# Patient Record
Sex: FEMALE | Race: WHITE | NOT HISPANIC OR LATINO | Employment: OTHER | URBAN - METROPOLITAN AREA
[De-identification: names, ages, dates, MRNs, and addresses within clinical notes are randomized per-mention and may not be internally consistent; named-entity substitution may affect disease eponyms.]

---

## 2017-01-20 ENCOUNTER — TELEPHONE (OUTPATIENT)
Dept: SURGERY | Facility: CLINIC | Age: 67
End: 2017-01-20

## 2017-01-20 NOTE — TELEPHONE ENCOUNTER
Called pt to get her imaging rescheduled.    She is scheduled for [facility] Diagnostic Mammogram @ [facility] 4-25-17 @ 10:00  (pt picked date)    Return to see Dr FERRARO 5-12-17 arrive 9:45    kdl

## 2017-04-24 ENCOUNTER — TELEPHONE (OUTPATIENT)
Dept: SURGERY | Facility: CLINIC | Age: 67
End: 2017-04-24

## 2017-04-24 DIAGNOSIS — Z12.39 BREAST SCREENING: Primary | ICD-10-CM

## 2017-04-24 NOTE — TELEPHONE ENCOUNTER
[facility] called, pt never had her imaging in Nov 2016   She is now due her routine mammogram. [facility] needs an order   For this. Pt has appt with them on 4-26-17     Thank you   k     I will arrange for her to have a left mammogram in November 2016 as a 6 month follow-up.  Her next routine mammogram is due April 26, 2017.  [facility].           Spoke with Beverly at [facility]  I faxed over order for Avel Screen Mammo 3D  Since pt is already scheduled for tomorrow they will add this on.  kdl 4-24-17

## 2017-04-26 ENCOUNTER — TELEPHONE (OUTPATIENT)
Dept: SURGERY | Facility: CLINIC | Age: 67
End: 2017-04-26

## 2017-04-26 NOTE — TELEPHONE ENCOUNTER
[facility] April 25, 2017 bilateral mammogram with shawna synthesis.  The breast is heterogenous leg dense.  No mammographic evidence for malignancy in either breast.  BI-RADS 2.

## 2017-05-01 ENCOUNTER — OFFICE VISIT (OUTPATIENT)
Dept: SURGERY | Facility: CLINIC | Age: 67
End: 2017-05-01

## 2017-05-01 VITALS
TEMPERATURE: 98.1 F | HEIGHT: 62 IN | BODY MASS INDEX: 30 KG/M2 | SYSTOLIC BLOOD PRESSURE: 142 MMHG | OXYGEN SATURATION: 96 % | HEART RATE: 85 BPM | WEIGHT: 163 LBS | DIASTOLIC BLOOD PRESSURE: 83 MMHG

## 2017-05-01 DIAGNOSIS — Z12.39 BREAST SCREENING: ICD-10-CM

## 2017-05-01 DIAGNOSIS — N60.19 DIFFUSE CYSTIC MASTOPATHY, UNSPECIFIED LATERALITY: Primary | ICD-10-CM

## 2017-05-01 DIAGNOSIS — Z80.41 FH: OVARIAN CANCER: ICD-10-CM

## 2017-05-01 PROCEDURE — 99212 OFFICE O/P EST SF 10 MIN: CPT | Performed by: SURGERY

## 2017-05-01 RX ORDER — PRAVASTATIN SODIUM 20 MG
TABLET ORAL
Refills: 5 | COMMUNITY
Start: 2017-04-17

## 2017-08-07 ENCOUNTER — CONVERSION ENCOUNTER (OUTPATIENT)
Dept: ORTHOPEDIC SURGERY | Facility: CLINIC | Age: 67
End: 2017-08-07

## 2017-09-05 ENCOUNTER — HOSPITAL ENCOUNTER (OUTPATIENT)
Dept: OTHER | Facility: HOSPITAL | Age: 67
Discharge: HOME | End: 2017-09-05
Attending: ORTHOPAEDIC SURGERY | Admitting: ORTHOPAEDIC SURGERY

## 2018-11-15 ENCOUNTER — TRANSCRIBE ORDERS (OUTPATIENT)
Dept: ADMINISTRATIVE | Facility: HOSPITAL | Age: 68
End: 2018-11-15

## 2018-11-15 DIAGNOSIS — M25.561 ACUTE BILATERAL KNEE PAIN: Primary | ICD-10-CM

## 2018-11-15 DIAGNOSIS — M25.562 ACUTE BILATERAL KNEE PAIN: Primary | ICD-10-CM

## 2018-11-15 DIAGNOSIS — Z96.653 HISTORY OF BILATERAL KNEE REPLACEMENT: ICD-10-CM

## 2018-11-19 ENCOUNTER — HOSPITAL ENCOUNTER (OUTPATIENT)
Dept: NUCLEAR MEDICINE | Facility: HOSPITAL | Age: 68
Discharge: HOME | End: 2018-11-19
Attending: ORTHOPAEDIC SURGERY

## 2018-11-19 ENCOUNTER — TRANSCRIBE ORDERS (OUTPATIENT)
Dept: ADMINISTRATIVE | Facility: HOSPITAL | Age: 68
End: 2018-11-19

## 2018-11-19 ENCOUNTER — LAB (OUTPATIENT)
Dept: LAB | Facility: HOSPITAL | Age: 68
End: 2018-11-19
Attending: ORTHOPAEDIC SURGERY

## 2018-11-19 DIAGNOSIS — Z96.653 HISTORY OF BILATERAL KNEE REPLACEMENT: ICD-10-CM

## 2018-11-19 DIAGNOSIS — M25.562 ACUTE BILATERAL KNEE PAIN: ICD-10-CM

## 2018-11-19 DIAGNOSIS — M25.561 ACUTE BILATERAL KNEE PAIN: ICD-10-CM

## 2018-11-19 DIAGNOSIS — Z47.89 ORTHOPEDIC AFTERCARE: ICD-10-CM

## 2018-11-19 DIAGNOSIS — M25.561 RIGHT KNEE PAIN, UNSPECIFIED CHRONICITY: ICD-10-CM

## 2018-11-19 DIAGNOSIS — M25.561 RIGHT KNEE PAIN, UNSPECIFIED CHRONICITY: Primary | ICD-10-CM

## 2018-11-19 LAB
CRP SERPL-MCNC: 0.22 MG/DL (ref 0–0.5)
DEPRECATED RDW RBC AUTO: 56.9 FL (ref 37–54)
ERYTHROCYTE [DISTWIDTH] IN BLOOD BY AUTOMATED COUNT: 18.6 % (ref 11.7–13)
ERYTHROCYTE [SEDIMENTATION RATE] IN BLOOD: 13 MM/HR (ref 0–30)
HCT VFR BLD AUTO: 38.2 % (ref 35.6–45.5)
HGB BLD-MCNC: 11.4 G/DL (ref 11.9–15.5)
MCH RBC QN AUTO: 25 PG (ref 26.9–32)
MCHC RBC AUTO-ENTMCNC: 29.8 G/DL (ref 32.4–36.3)
MCV RBC AUTO: 83.8 FL (ref 80.5–98.2)
PLATELET # BLD AUTO: 350 10*3/MM3 (ref 140–500)
PMV BLD AUTO: 9 FL (ref 6–12)
RBC # BLD AUTO: 4.56 10*6/MM3 (ref 3.9–5.2)
WBC NRBC COR # BLD: 6.02 10*3/MM3 (ref 4.5–10.7)

## 2018-11-19 PROCEDURE — 85652 RBC SED RATE AUTOMATED: CPT

## 2018-11-19 PROCEDURE — 0 TECHNETIUM MEDRONATE KIT: Performed by: ORTHOPAEDIC SURGERY

## 2018-11-19 PROCEDURE — 85027 COMPLETE CBC AUTOMATED: CPT

## 2018-11-19 PROCEDURE — 78315 BONE IMAGING 3 PHASE: CPT

## 2018-11-19 PROCEDURE — A9503 TC99M MEDRONATE: HCPCS | Performed by: ORTHOPAEDIC SURGERY

## 2018-11-19 PROCEDURE — 36415 COLL VENOUS BLD VENIPUNCTURE: CPT

## 2018-11-19 PROCEDURE — 86140 C-REACTIVE PROTEIN: CPT

## 2018-11-19 RX ORDER — TC 99M MEDRONATE 20 MG/10ML
21.8 INJECTION, POWDER, LYOPHILIZED, FOR SOLUTION INTRAVENOUS
Status: COMPLETED | OUTPATIENT
Start: 2018-11-19 | End: 2018-11-19

## 2018-11-19 RX ADMIN — Medication 21.8 MILLICURIE: at 09:31

## 2019-06-04 VITALS
SYSTOLIC BLOOD PRESSURE: 111 MMHG | WEIGHT: 153 LBS | HEART RATE: 68 BPM | DIASTOLIC BLOOD PRESSURE: 69 MMHG | BODY MASS INDEX: 28.44 KG/M2

## 2020-10-29 ENCOUNTER — INPATIENT HOSPITAL (OUTPATIENT)
Dept: URBAN - METROPOLITAN AREA HOSPITAL 76 | Facility: HOSPITAL | Age: 70
End: 2020-10-29

## 2020-10-29 DIAGNOSIS — K44.9 DIAPHRAGMATIC HERNIA WITHOUT OBSTRUCTION OR GANGRENE: ICD-10-CM

## 2020-10-29 DIAGNOSIS — J45.909 UNSPECIFIED ASTHMA, UNCOMPLICATED: ICD-10-CM

## 2020-10-29 DIAGNOSIS — I10 ESSENTIAL (PRIMARY) HYPERTENSION: ICD-10-CM

## 2020-10-29 DIAGNOSIS — Z86.19 PERSONAL HISTORY OF OTHER INFECTIOUS AND PARASITIC DISEASES: ICD-10-CM

## 2020-10-29 DIAGNOSIS — R10.10 UPPER ABDOMINAL PAIN, UNSPECIFIED: ICD-10-CM

## 2020-10-29 DIAGNOSIS — R74.8 ABNORMAL LEVELS OF OTHER SERUM ENZYMES: ICD-10-CM

## 2020-10-29 PROCEDURE — 99222 1ST HOSP IP/OBS MODERATE 55: CPT | Performed by: NURSE PRACTITIONER

## 2020-10-30 ENCOUNTER — INPATIENT HOSPITAL (OUTPATIENT)
Dept: URBAN - METROPOLITAN AREA HOSPITAL 76 | Facility: HOSPITAL | Age: 70
End: 2020-10-30

## 2020-10-30 DIAGNOSIS — R10.13 EPIGASTRIC PAIN: ICD-10-CM

## 2020-10-30 DIAGNOSIS — K85.90 ACUTE PANCREATITIS WITHOUT NECROSIS OR INFECTION, UNSPECIFIE: ICD-10-CM

## 2020-10-30 DIAGNOSIS — K44.9 DIAPHRAGMATIC HERNIA WITHOUT OBSTRUCTION OR GANGRENE: ICD-10-CM

## 2020-10-30 PROCEDURE — 43235 EGD DIAGNOSTIC BRUSH WASH: CPT | Performed by: INTERNAL MEDICINE

## 2020-10-31 ENCOUNTER — INPATIENT HOSPITAL (OUTPATIENT)
Dept: URBAN - METROPOLITAN AREA HOSPITAL 76 | Facility: HOSPITAL | Age: 70
End: 2020-10-31

## 2020-10-31 DIAGNOSIS — K85.90 ACUTE PANCREATITIS WITHOUT NECROSIS OR INFECTION, UNSPECIFIE: ICD-10-CM

## 2020-10-31 PROCEDURE — 99232 SBSQ HOSP IP/OBS MODERATE 35: CPT | Performed by: INTERNAL MEDICINE

## 2020-11-01 ENCOUNTER — INPATIENT HOSPITAL (OUTPATIENT)
Dept: URBAN - METROPOLITAN AREA HOSPITAL 76 | Facility: HOSPITAL | Age: 70
End: 2020-11-01

## 2020-11-01 DIAGNOSIS — K85.90 ACUTE PANCREATITIS WITHOUT NECROSIS OR INFECTION, UNSPECIFIE: ICD-10-CM

## 2020-11-01 DIAGNOSIS — R10.13 EPIGASTRIC PAIN: ICD-10-CM

## 2020-11-01 PROCEDURE — 99232 SBSQ HOSP IP/OBS MODERATE 35: CPT | Performed by: INTERNAL MEDICINE

## 2020-11-04 ENCOUNTER — INPATIENT HOSPITAL (OUTPATIENT)
Dept: URBAN - METROPOLITAN AREA HOSPITAL 76 | Facility: HOSPITAL | Age: 70
End: 2020-11-04

## 2020-11-04 DIAGNOSIS — K44.9 DIAPHRAGMATIC HERNIA WITHOUT OBSTRUCTION OR GANGRENE: ICD-10-CM

## 2020-11-04 DIAGNOSIS — R10.13 EPIGASTRIC PAIN: ICD-10-CM

## 2020-11-04 DIAGNOSIS — R74.8 ABNORMAL LEVELS OF OTHER SERUM ENZYMES: ICD-10-CM

## 2020-11-04 PROCEDURE — 99221 1ST HOSP IP/OBS SF/LOW 40: CPT | Performed by: INTERNAL MEDICINE

## 2020-11-05 ENCOUNTER — INPATIENT HOSPITAL (OUTPATIENT)
Dept: URBAN - METROPOLITAN AREA HOSPITAL 76 | Facility: HOSPITAL | Age: 70
End: 2020-11-05

## 2020-11-05 DIAGNOSIS — R10.13 EPIGASTRIC PAIN: ICD-10-CM

## 2020-11-05 DIAGNOSIS — R93.2 ABNORMAL FINDINGS ON DIAGNOSTIC IMAGING OF LIVER AND BILIARY: ICD-10-CM

## 2020-11-05 DIAGNOSIS — R10.11 RIGHT UPPER QUADRANT PAIN: ICD-10-CM

## 2020-11-05 DIAGNOSIS — R74.8 ABNORMAL LEVELS OF OTHER SERUM ENZYMES: ICD-10-CM

## 2020-11-05 DIAGNOSIS — K86.1 OTHER CHRONIC PANCREATITIS: ICD-10-CM

## 2020-11-05 DIAGNOSIS — K44.9 DIAPHRAGMATIC HERNIA WITHOUT OBSTRUCTION OR GANGRENE: ICD-10-CM

## 2020-11-05 PROCEDURE — 99231 SBSQ HOSP IP/OBS SF/LOW 25: CPT | Performed by: NURSE PRACTITIONER

## 2020-11-11 ENCOUNTER — ON CAMPUS - OUTPATIENT (OUTPATIENT)
Dept: URBAN - METROPOLITAN AREA HOSPITAL 77 | Facility: HOSPITAL | Age: 70
End: 2020-11-11

## 2020-11-11 DIAGNOSIS — K44.9 DIAPHRAGMATIC HERNIA WITHOUT OBSTRUCTION OR GANGRENE: ICD-10-CM

## 2020-11-11 DIAGNOSIS — R10.13 EPIGASTRIC PAIN: ICD-10-CM

## 2020-11-11 DIAGNOSIS — R94.5 ABNORMAL RESULTS OF LIVER FUNCTION STUDIES: ICD-10-CM

## 2020-11-11 DIAGNOSIS — K83.8 OTHER SPECIFIED DISEASES OF BILIARY TRACT: ICD-10-CM

## 2020-11-11 PROCEDURE — 43262 ENDO CHOLANGIOPANCREATOGRAPH: CPT | Performed by: INTERNAL MEDICINE

## 2020-12-11 ENCOUNTER — ON CAMPUS - OUTPATIENT (OUTPATIENT)
Dept: URBAN - METROPOLITAN AREA HOSPITAL 77 | Facility: HOSPITAL | Age: 70
End: 2020-12-11

## 2020-12-11 DIAGNOSIS — K29.50 UNSPECIFIED CHRONIC GASTRITIS WITHOUT BLEEDING: ICD-10-CM

## 2020-12-11 DIAGNOSIS — K85.90 ACUTE PANCREATITIS WITHOUT NECROSIS OR INFECTION, UNSPECIFIE: ICD-10-CM

## 2020-12-11 DIAGNOSIS — K86.89 OTHER SPECIFIED DISEASES OF PANCREAS: ICD-10-CM

## 2020-12-11 DIAGNOSIS — K83.8 OTHER SPECIFIED DISEASES OF BILIARY TRACT: ICD-10-CM

## 2020-12-11 DIAGNOSIS — R94.5 ABNORMAL RESULTS OF LIVER FUNCTION STUDIES: ICD-10-CM

## 2020-12-11 DIAGNOSIS — K44.9 DIAPHRAGMATIC HERNIA WITHOUT OBSTRUCTION OR GANGRENE: ICD-10-CM

## 2020-12-11 PROCEDURE — 43238 EGD US FINE NEEDLE BX/ASPIR: CPT | Performed by: INTERNAL MEDICINE

## 2021-10-26 ENCOUNTER — OFFICE (OUTPATIENT)
Dept: URBAN - METROPOLITAN AREA CLINIC 64 | Facility: CLINIC | Age: 71
End: 2021-10-26

## 2021-10-26 VITALS
HEIGHT: 62 IN | DIASTOLIC BLOOD PRESSURE: 70 MMHG | WEIGHT: 153 LBS | HEART RATE: 78 BPM | SYSTOLIC BLOOD PRESSURE: 109 MMHG

## 2021-10-26 DIAGNOSIS — R19.7 DIARRHEA, UNSPECIFIED: ICD-10-CM

## 2021-10-26 DIAGNOSIS — R10.9 UNSPECIFIED ABDOMINAL PAIN: ICD-10-CM

## 2021-10-26 PROCEDURE — 99213 OFFICE O/P EST LOW 20 MIN: CPT | Performed by: INTERNAL MEDICINE

## 2021-10-26 RX ORDER — DICYCLOMINE HYDROCHLORIDE 10 MG/1
CAPSULE ORAL
Qty: 90 | Refills: 0 | Status: ACTIVE

## 2021-11-22 ENCOUNTER — OFFICE (OUTPATIENT)
Dept: URBAN - METROPOLITAN AREA CLINIC 64 | Facility: CLINIC | Age: 71
End: 2021-11-22

## 2021-11-22 VITALS
DIASTOLIC BLOOD PRESSURE: 80 MMHG | HEIGHT: 62 IN | HEART RATE: 71 BPM | SYSTOLIC BLOOD PRESSURE: 138 MMHG | WEIGHT: 155 LBS

## 2021-11-22 DIAGNOSIS — R19.7 DIARRHEA, UNSPECIFIED: ICD-10-CM

## 2021-11-22 DIAGNOSIS — K21.9 GASTRO-ESOPHAGEAL REFLUX DISEASE WITHOUT ESOPHAGITIS: ICD-10-CM

## 2021-11-22 PROCEDURE — 99213 OFFICE O/P EST LOW 20 MIN: CPT | Performed by: INTERNAL MEDICINE

## 2021-11-22 RX ORDER — DICYCLOMINE HYDROCHLORIDE 10 MG/1
CAPSULE ORAL
Qty: 90 | Refills: 0 | Status: ACTIVE

## 2021-11-22 RX ORDER — DICYCLOMINE HYDROCHLORIDE 20 MG/1
20 TABLET ORAL
Qty: 90 | Refills: 3 | Status: ACTIVE
Start: 2021-11-22

## 2025-01-16 ENCOUNTER — HOSPITAL ENCOUNTER (OUTPATIENT)
Dept: GENERAL RADIOLOGY | Facility: HOSPITAL | Age: 75
Discharge: HOME | End: 2025-01-16
Payer: MEDICARE

## 2025-01-16 ENCOUNTER — PRE-ADMISSION TESTING (OUTPATIENT)
Dept: PREADMISSION TESTING | Facility: HOSPITAL | Age: 75
End: 2025-01-16
Payer: MEDICARE

## 2025-01-16 VITALS
WEIGHT: 156 LBS | TEMPERATURE: 98.7 F | HEIGHT: 62 IN | OXYGEN SATURATION: 96 % | DIASTOLIC BLOOD PRESSURE: 83 MMHG | HEART RATE: 92 BPM | SYSTOLIC BLOOD PRESSURE: 161 MMHG | BODY MASS INDEX: 28.71 KG/M2 | RESPIRATION RATE: 18 BRPM

## 2025-01-16 LAB
ALBUMIN SERPL-MCNC: 3.8 G/DL (ref 3.5–5.2)
ALBUMIN/GLOB SERPL: 1.3 G/DL
ALP SERPL-CCNC: 73 U/L (ref 39–117)
ALT SERPL W P-5'-P-CCNC: 13 U/L (ref 1–33)
ANION GAP SERPL CALCULATED.3IONS-SCNC: 10.8 MMOL/L (ref 5–15)
AST SERPL-CCNC: 18 U/L (ref 1–32)
BILIRUB SERPL-MCNC: 0.3 MG/DL (ref 0–1.2)
BILIRUB UR QL STRIP: NEGATIVE
BUN SERPL-MCNC: 17 MG/DL (ref 8–23)
BUN/CREAT SERPL: 13.8 (ref 7–25)
CALCIUM SPEC-SCNC: 8.6 MG/DL (ref 8.6–10.5)
CHLORIDE SERPL-SCNC: 106 MMOL/L (ref 98–107)
CLARITY UR: CLEAR
CO2 SERPL-SCNC: 23.2 MMOL/L (ref 22–29)
COLOR UR: YELLOW
CREAT SERPL-MCNC: 1.23 MG/DL (ref 0.57–1)
DEPRECATED RDW RBC AUTO: 47 FL (ref 37–54)
EGFRCR SERPLBLD CKD-EPI 2021: 46.5 ML/MIN/1.73
ERYTHROCYTE [DISTWIDTH] IN BLOOD BY AUTOMATED COUNT: 14.1 % (ref 12.3–15.4)
GLOBULIN UR ELPH-MCNC: 3 GM/DL
GLUCOSE SERPL-MCNC: 112 MG/DL (ref 65–99)
GLUCOSE UR STRIP-MCNC: NEGATIVE MG/DL
HCT VFR BLD AUTO: 39.7 % (ref 34–46.6)
HGB BLD-MCNC: 12.7 G/DL (ref 12–15.9)
HGB UR QL STRIP.AUTO: NEGATIVE
KETONES UR QL STRIP: NEGATIVE
LEUKOCYTE ESTERASE UR QL STRIP.AUTO: NEGATIVE
MCH RBC QN AUTO: 29.2 PG (ref 26.6–33)
MCHC RBC AUTO-ENTMCNC: 32 G/DL (ref 31.5–35.7)
MCV RBC AUTO: 91.3 FL (ref 79–97)
NITRITE UR QL STRIP: NEGATIVE
PH UR STRIP.AUTO: 6.5 [PH] (ref 5–8)
PLATELET # BLD AUTO: 367 10*3/MM3 (ref 140–450)
PMV BLD AUTO: 9.5 FL (ref 6–12)
POTASSIUM SERPL-SCNC: 3.7 MMOL/L (ref 3.5–5.2)
PROT SERPL-MCNC: 6.8 G/DL (ref 6–8.5)
PROT UR QL STRIP: NEGATIVE
QT INTERVAL: 385 MS
QTC INTERVAL: 447 MS
RBC # BLD AUTO: 4.35 10*6/MM3 (ref 3.77–5.28)
SODIUM SERPL-SCNC: 140 MMOL/L (ref 136–145)
SP GR UR STRIP: 1.02 (ref 1–1.03)
UROBILINOGEN UR QL STRIP: NORMAL
WBC NRBC COR # BLD AUTO: 5.84 10*3/MM3 (ref 3.4–10.8)

## 2025-01-16 PROCEDURE — 36415 COLL VENOUS BLD VENIPUNCTURE: CPT

## 2025-01-16 PROCEDURE — 93010 ELECTROCARDIOGRAM REPORT: CPT | Performed by: INTERNAL MEDICINE

## 2025-01-16 PROCEDURE — 93005 ELECTROCARDIOGRAM TRACING: CPT

## 2025-01-16 PROCEDURE — 80053 COMPREHEN METABOLIC PANEL: CPT

## 2025-01-16 PROCEDURE — 71046 X-RAY EXAM CHEST 2 VIEWS: CPT

## 2025-01-16 PROCEDURE — 81003 URINALYSIS AUTO W/O SCOPE: CPT

## 2025-01-16 PROCEDURE — 85027 COMPLETE CBC AUTOMATED: CPT

## 2025-01-16 PROCEDURE — 73030 X-RAY EXAM OF SHOULDER: CPT

## 2025-01-16 RX ORDER — AMLODIPINE BESYLATE 10 MG/1
10 TABLET ORAL NIGHTLY
COMMUNITY

## 2025-01-16 RX ORDER — FLUOXETINE 40 MG/1
40 CAPSULE ORAL NIGHTLY
COMMUNITY

## 2025-01-16 RX ORDER — BUPROPION HYDROCHLORIDE 300 MG/1
300 TABLET ORAL NIGHTLY
COMMUNITY

## 2025-01-16 RX ORDER — DICYCLOMINE HYDROCHLORIDE 10 MG/1
10 CAPSULE ORAL
COMMUNITY

## 2025-01-16 RX ORDER — TRIAMTERENE CAPSULES 50 MG/1
50 CAPSULE ORAL NIGHTLY
COMMUNITY

## 2025-01-16 RX ORDER — MONTELUKAST SODIUM 10 MG/1
10 TABLET ORAL NIGHTLY
COMMUNITY

## 2025-01-16 NOTE — DISCHARGE INSTRUCTIONS
Take the following medications the morning of surgery: NONE      If you are on prescription narcotic pain medication to control your pain you may also take that medication the morning of surgery.      General Instructions:     Do not eat solid food after midnight the night before surgery.  Clear liquids day of surgery are allowed but must be stopped at least two hours before your hospital arrival time.       Allowed clear liquids      Water, sodas, and tea or coffee with no cream or milk added.       12 to 20 ounces of a clear liquid that contains carbohydrates is recommended.  If non-diabetic, have Gatorade or Powerade.  If diabetic, have G2 or Powerade Zero.     Do not have liquids red in color.  Do not consume chicken, beef, pork or vegetable broth or bouillon cubes of any variety as they are not considered clear liquids and are not allowed.      Infants may have breast milk up to four hours before surgery.  Infants drinking formula may drink formula up to six hours before surgery.   Patients who avoid smoking, chewing tobacco and alcohol for 4 weeks prior to surgery have a reduced risk of post-operative complications.  Quit smoking as many days before surgery as you can.  Do not smoke, use chewing tobacco or drink alcohol the day of surgery.   If applicable bring your C-PAP/ BI-PAP machine in with you to preop day of surgery.  Bring any papers given to you in the doctor’s office.  Wear clean comfortable clothes.  Do not wear contact lenses, false eyelashes or make-up.  Bring a case for your glasses.   Bring crutches or walker if applicable.  Remove all piercings.  Leave jewelry and any other valuables at home.  Hair extensions with metal clips must be removed prior to surgery.  The Pre-Admission Testing nurse will instruct you to bring medications if unable to obtain an accurate list in Pre-Admission Testing.          Day of surgery:  Your arrival time is approximately two hours before your scheduled surgery  time.  Upon arrival, a Pre-op nurse and Anesthesiologist will review your health history, obtain vital signs, and answer questions you may have.  The only belongings needed at this time will be a list of your home medications and if applicable your C-PAP/BI-PAP machine.  A Pre-op nurse will start an IV and you may receive medication in preparation for surgery, including something to help you relax.     Please be aware that surgery does come with discomfort.  We want to make every effort to control your discomfort so please discuss any uncontrolled symptoms with your nurse.   Your doctor will most likely have prescribed pain medications.      If you are going home after surgery you will receive individualized written care instructions before being discharged.  A responsible adult must drive you to and from the hospital on the day of your surgery and ideally stay with you through the night.  Discharge prescriptions can be filled by the hospital pharmacy during regular pharmacy hours.  If you are having surgery late in the day/evening your prescription may be e-prescribed to your pharmacy.  Please verify your pharmacy hours or chose a 24 hour pharmacy to avoid not having access to your prescription because your pharmacy has closed for the day.    If you are staying overnight following surgery, you will be transported to your hospital room following the recovery period.  [facility] has all private rooms.    If you have any questions please call Pre-Admission Testing at [phone].  Deductibles and co-payments are collected on the day of service. Please be prepared to pay the required co-pay, deductible or deposit on the day of service as defined by your plan.    Call your surgeon immediately if you experience any of the following symptoms:  Sore Throat  Shortness of Breath or difficulty breathing  Cough  Chills  Body soreness or muscle pain  Headache  Fever  New loss of taste or smell  Do not arrive  for your surgery ill.  Your procedure will need to be rescheduled to another time.  You will need to call your physician before the day of surgery to avoid any unnecessary exposure to hospital staff as well as other patients.        PREVENTING INFECTION IN SHOULDER SURGICAL SITES     C. acnes is a bacteria that lives deep within follicles and pores of the skin. It is found in large numbers on the skin of the face, axilla (armpit), chest and back and is the primary bacteria to cause a surgical site infection after shoulder surgery.      Use of a Benzoyl Peroxide solution prior to shoulder surgery decreases C. acnes and reduces post-op infections.   Your surgeon has ordered 5% Benzoyl Peroxide wash to be used three times prior to your surgery.     Please read the following instructions carefully and bring this form with you the day of surgery.     General bathing instructions starting two days before your surgery:    Shower using a fresh bar of anti-bacterial soap (such as Dial) and clean washcloth.  Pay special attention to the neck, shoulder and armpit area.   Wash your hair as usual with your regular shampoo.   Rinse hair and body thoroughly with warm water (not hot water) to remove shampoo and residue.   Dry with a clean towel.              Sleep in a clean bed with clean clothing.  Do not allow pets to sleep with you.     For 2 days before surgery, avoid shaving with a razor because the razor can irritate skin and make it easier to develop an infection.    Any areas of open skin can increase the risk of a post-operative wound infection by allowing bacteria to enter and travel throughout the body.  Notify your surgeon if you have any skin wounds / rashes even if it is not near the expected surgical site.  The area will need assessed to determine if surgery should be delayed until it is healed.      First application of 5% Benzoyl Peroxide Wash two nights before surgery:                                                                 Wash neck, shoulder (front, back, side) and armpit   with warm water, rinse and dry - see picture.  Gently wash the same areas with the Benzoyl Peroxide   cleanser going away from the neck for 10-20 seconds.   Work into a full lather and leave on the skin for   2 minutes for greatest effect.  Rinse thoroughly with warm water, not hot water.                     Pat dry with a clean towel.                                                            Wash your hands thoroughly.  Do not apply lotion, powder, perfume or deodorant.   Put on clean clothes.    Second application the night before surgery:  Repeat the above steps.        Third application morning of surgery:  Repeat the above steps.    Due to shoulder pain or decreased range of motion of your shoulder and arm, you may need assistance washing under the arm or the back portion of your shoulder.     Avoid further washing the areas of the skin treated with Benzoyl Peroxide for at least 1 hour.    For your convenience, you may purchase Benzoyl Peroxide at [facility] retail pharmacy.     Warning:  Let your physician know if you are allergic to Benzoyl Peroxide or have very sensitive skin and cannot use it.   Stop using and contact your surgeon if you experience any excessive scaling, itching, swelling, skin irritation or other signs of a reaction.  Keep out of eyes, ears, nose and mouth.  Do not apply to sunburned, irritated or broken area on the skin.  Avoid unwanted problems with bleaching effect by following these tips:  Wash hands after each use.  Avoid contact with hair, clothing, furnishings or carpeting.  Wear clean, old t-shirt or clothing to bed.   Use clean, old white pillow cases and sheets to avoid discoloring your bed linens.                                                                                                                                                                                                                                                                                    Please complete the checklist below, bring it with you to the hospital                               the day of surgery and give to the Pre-op Nurse     Preoperative Skin Prep Checklist        Patient Name Label             Enter dates and ?  boxes to indicate completed    Surgery Date: _______________ Regular Shower  Benzoyl Peroxide Wash   First Application:  2 days before_______________  (Stop shaving all body parts)           Morning or Evening                        Evening    Second Application:  1 day  before________________        Morning or Evening                          Evening   Third Application:  Day of Surgery______________                           Morning                   Morning

## 2025-01-17 ENCOUNTER — TELEPHONE (OUTPATIENT)
Dept: ORTHOPEDIC SURGERY | Facility: HOSPITAL | Age: 75
End: 2025-01-17
Payer: MEDICARE

## 2025-01-17 NOTE — TELEPHONE ENCOUNTER
Risk Factor yes no   Age >75  X   BMI <20 >40  X   Patient History     Chronic Pain (2 or more meds/Pain Management)  X   ETOH (more than 3 drinks Daily)  X   Uncontrolled Depression/Bipolar/Schizoaffective Disorder  X   Arrhythmias--  X   Stent placement/MI  X   DVT/PE  X   Pacemaker  X   HTN (uncontrolled or requiring more than 2 medications)  X   CHF/Retained fluids/Edema  X   Stroke with Residual   X   COPD/Asthma  X   ALEJANDRO--Non-compliant with CPAP  X   Diabetes (on insulin or more than 2 meds)         A1C:  X   BPH/Urinary retention (on medication)  X   CKD  X   Home environment and support     Current ambulation status (use of cane, walker, W/C, Multiple falls/weakness)  X   Stairs to enter and throughout home X    Lives Alone  X   Doesn't have support at home  X   Outpatient Screening Assessment    Home needs: (Walker/BSC):  TOTAL SHOULDER  ? Steps 3 steps  Caregiver 24-48hrs post-discharge:       Discharge Plan:   Total Shoulder   Prescriptions: Meds to bed    Home medications:   [] Blood thinner/anti-coag therapy--   [] BPH or diuretic--  ? BP meds-- Norvasc  [] Pain/Anti-inflammatories--  Pre-op Education:  Educate patient on spinal anesthesia/pain control:  ? patient verbalize understanding    Educate patient on hospital course/timeline:  ?  patient verbalize understanding    Joint Care Class:  []  yes ? no  Notes:

## 2025-01-21 NOTE — H&P
HPI  Chief complaint left shoulder pain  History of present illness: Follow-up exam for left shoulder. Injection only gave minimal relief. She continues to be significantly symptomatic. Briefly, this 74-year-old  female complains of left shoulder pain. She actually had a right reverse total shoulder arthroplasty 9/12/2017 and is doing well on that side. The left shoulder became symptomatic and has gradually worsened over the past year or so but the last several months has been intensified. She has pain with movement, sleeping, trying to dress herself. She has stiffness, soreness and sharp pain with weakness. Pain levels at rest 1-2 out of 10 but with activity it is 8-9 out of 10. She uses ibuprofen, Tylenol and/or tramadol for pain control.  Physical Exam  She is tender anterolateral over the left shoulder. She has a positive Neer and Óscar Garber test.  Assessment / Plan  Previous three-view x-ray left shoulder shows anterior acromial spurring. Slight narrowing of glenohumeral joint space. No other significant abnormalities.  Three-view x-ray left elbow shows severe end-stage osteoarthritic change both in the radiocapitellar and ulnohumeral joint.    Assessment:  1. Rotator cuff strain/tendinitis left shoulder    MRI left shoulder:  IMPRESSION     1. Tendinosis of the rotator cuff complex with a 1 x 1.5 cm full-thickness tear  distal supraspinatus tendon.  2. Moderate subacromial subdeltoid bursitis.    3. Mild tendinosis of the intra-articular portion long head biceps tendon with  moderate tenosynovitis.    4. No labral tear.    5. Moderate AC joint osteoarthritis.    Plan:     We discussed a left reverse total shoulder arthroplasty.  She would like to proceed.   we discussed the benefits of surgical intervention, as well as alternative treatments.  Potential surgical risks and complications include but are not limited to DVT, infection, neurovascular injury, fracture, implant wear, failure, possible  need for revision surgery, loss of motion, dislocation, limb length changes.  Sufficient opportunity was given to discuss the condition and treatment plan with the doctor, and all questions were answered for the patient.  Nonsurgical measures such as injections, medications, or physical therapy may not offer significant relief to this patient.  The patient agreed to proceed with the surgery.      This patient will experience restricted and limited mobility post-surgically. A Caprini DVT Risk Assessment for development of deep vein thrombosis (DVT) during the first 30 days of the post-procedural period finds this patient at high risk. Based on the patient's limited mobility following surgery, and past medical history and post-op risk of DVT, I am ordering home-use Portable Mechanical Compression devices (PMC) to stimulate circulation, decrease swelling, and reduce the likelihood of a VTE event. I have prescribed Portable Mechanical Compression devices for home-use for a period of 30 days post-surgery. I find this to be medically necessary to limit any additional risk of complications and bleeding.  Portable Mechanical Compression devices applied to the lower extremities will be used 3 hours QD and any time the patient is at rest, including during bed rest.    The patient understands these are provided in lieu of or in conjunction with a chemoprophylaxis, as the use of heparin-derived chemoprophylaxis for deep vein thrombosis (DVT) following medical procedures is associated with significantly increased risks of bleeding complications, which is considered a contraindication in these procedures. The use of a PMC device kit is a proven alternative which safely increases volumetric venous blood flow and decreases the rate of post-procedure VTE/DVT/PE and will be post-surgically applied without delay for this patient.    Portable Mechanical Compression devices are recommended for patients who receive pharmacologic  prophylaxis, using combined prophylaxis with mechanical and pharmacological methods over prophylaxis with pharmacological agents alone. The use of PMC devices for home-therapy DVT prevention is a proven alternative which safely decreases the rate of post-procedure DVT and should be approved without delay for this patient. In my opinion, this is medically necessary and reasonable, and is in accordance with accepted standards of contemporary medical practices, as referenced within the following studies:    Journal of Arthroplasty 2017. Efficacy in Deep Vein Thrombosis Prevention With Extended Mechanical Compression Device Therapy and Prophylactic Aspirin Following Total Knee Arthroplasty: A Randomized Control Trial. May; 32(5):4082-9015)    Association of periOperative Registered Nurses (AORN). (2019). 2019 Venous Thromboembolism, AORN Guidelines for Perioperative Practice. AORN Journal, 109(2), N972-U011. doi: 10.1002/aorn.34715    American Society of Hematology (2019). American Society of Hematology 2019 guidelines for management of venous thromboembolism: prevention of venous thromboembolism in surgical hospitalized patients. Blood Advances, 3(23), 2622-8615. doi: 10.1182/bloodadvances.4625084308

## 2025-01-23 ENCOUNTER — ANESTHESIA EVENT (OUTPATIENT)
Dept: PERIOP | Facility: HOSPITAL | Age: 75
End: 2025-01-23
Payer: MEDICARE

## 2025-01-23 ENCOUNTER — HOSPITAL ENCOUNTER (OUTPATIENT)
Facility: HOSPITAL | Age: 75
Setting detail: HOSPITAL OUTPATIENT SURGERY
Discharge: HOME | End: 2025-01-23
Attending: ORTHOPAEDIC SURGERY | Admitting: ORTHOPAEDIC SURGERY
Payer: MEDICARE

## 2025-01-23 ENCOUNTER — ANESTHESIA (OUTPATIENT)
Dept: PERIOP | Facility: HOSPITAL | Age: 75
End: 2025-01-23
Payer: MEDICARE

## 2025-01-23 ENCOUNTER — APPOINTMENT (OUTPATIENT)
Dept: GENERAL RADIOLOGY | Facility: HOSPITAL | Age: 75
End: 2025-01-23
Payer: MEDICARE

## 2025-01-23 VITALS
BODY MASS INDEX: 28.72 KG/M2 | SYSTOLIC BLOOD PRESSURE: 128 MMHG | RESPIRATION RATE: 16 BRPM | TEMPERATURE: 98.1 F | DIASTOLIC BLOOD PRESSURE: 80 MMHG | HEIGHT: 62 IN | HEART RATE: 78 BPM | WEIGHT: 156.09 LBS | OXYGEN SATURATION: 94 %

## 2025-01-23 DIAGNOSIS — Z96.612 S/P REVERSE TOTAL SHOULDER ARTHROPLASTY, LEFT: Primary | ICD-10-CM

## 2025-01-23 PROCEDURE — C1713 ANCHOR/SCREW BN/BN,TIS/BN: HCPCS | Performed by: ORTHOPAEDIC SURGERY

## 2025-01-23 PROCEDURE — C1776 JOINT DEVICE (IMPLANTABLE): HCPCS | Performed by: ORTHOPAEDIC SURGERY

## 2025-01-23 PROCEDURE — 25010000002 CEFAZOLIN PER 500 MG: Performed by: ORTHOPAEDIC SURGERY

## 2025-01-23 PROCEDURE — 25010000002 LIDOCAINE PF 2% 2 % SOLUTION: Performed by: NURSE ANESTHETIST, CERTIFIED REGISTERED

## 2025-01-23 PROCEDURE — 25010000002 PROPOFOL 200 MG/20ML EMULSION: Performed by: NURSE ANESTHETIST, CERTIFIED REGISTERED

## 2025-01-23 PROCEDURE — 25010000002 DEXAMETHASONE SODIUM PHOSPHATE 20 MG/5ML SOLUTION: Performed by: NURSE ANESTHETIST, CERTIFIED REGISTERED

## 2025-01-23 PROCEDURE — 25010000002 HYDROMORPHONE PER 4 MG: Performed by: NURSE ANESTHETIST, CERTIFIED REGISTERED

## 2025-01-23 PROCEDURE — 97535 SELF CARE MNGMENT TRAINING: CPT

## 2025-01-23 PROCEDURE — 25010000002 SUGAMMADEX 200 MG/2ML SOLUTION: Performed by: NURSE ANESTHETIST, CERTIFIED REGISTERED

## 2025-01-23 PROCEDURE — 25010000002 FENTANYL CITRATE (PF) 50 MCG/ML SOLUTION: Performed by: STUDENT IN AN ORGANIZED HEALTH CARE EDUCATION/TRAINING PROGRAM

## 2025-01-23 PROCEDURE — 97165 OT EVAL LOW COMPLEX 30 MIN: CPT

## 2025-01-23 PROCEDURE — 73020 X-RAY EXAM OF SHOULDER: CPT

## 2025-01-23 PROCEDURE — 25810000003 LACTATED RINGERS PER 1000 ML: Performed by: STUDENT IN AN ORGANIZED HEALTH CARE EDUCATION/TRAINING PROGRAM

## 2025-01-23 PROCEDURE — 25010000002 BUPIVACAINE LIPOSOME 1.3 % SUSPENSION: Performed by: STUDENT IN AN ORGANIZED HEALTH CARE EDUCATION/TRAINING PROGRAM

## 2025-01-23 PROCEDURE — 25010000002 ONDANSETRON PER 1 MG: Performed by: NURSE ANESTHETIST, CERTIFIED REGISTERED

## 2025-01-23 DEVICE — IMPLANTABLE DEVICE: Type: IMPLANTABLE DEVICE | Status: FUNCTIONAL

## 2025-01-23 DEVICE — GLENOID PLATE
Type: IMPLANTABLE DEVICE | Site: SHOULDER | Status: FUNCTIONAL
Brand: EQUINOXE

## 2025-01-23 DEVICE — IMPLANTABLE DEVICE
Type: IMPLANTABLE DEVICE | Site: SHOULDER | Status: FUNCTIONAL
Brand: EQUINOXE

## 2025-01-23 DEVICE — HUMERAL ADAPTER TRAY
Type: IMPLANTABLE DEVICE | Site: SHOULDER | Status: FUNCTIONAL
Brand: EQUINOXE®

## 2025-01-23 DEVICE — HUMERAL LINER, CONSTRAINED
Type: IMPLANTABLE DEVICE | Site: SHOULDER | Status: FUNCTIONAL
Brand: EQUINOXE®

## 2025-01-23 DEVICE — GLENOSPHERE
Type: IMPLANTABLE DEVICE | Site: SHOULDER | Status: FUNCTIONAL
Brand: EQUINOXE

## 2025-01-23 RX ORDER — OXYCODONE AND ACETAMINOPHEN 5; 325 MG/1; MG/1
1 TABLET ORAL EVERY 4 HOURS PRN
Qty: 30 TABLET | Refills: 0 | Status: SHIPPED | OUTPATIENT
Start: 2025-01-23

## 2025-01-23 RX ORDER — SODIUM CHLORIDE 0.9 % (FLUSH) 0.9 %
3 SYRINGE (ML) INJECTION EVERY 12 HOURS SCHEDULED
Status: DISCONTINUED | OUTPATIENT
Start: 2025-01-23 | End: 2025-01-23 | Stop reason: HOSPADM

## 2025-01-23 RX ORDER — FENTANYL CITRATE 50 UG/ML
50 INJECTION, SOLUTION INTRAMUSCULAR; INTRAVENOUS
Status: DISCONTINUED | OUTPATIENT
Start: 2025-01-23 | End: 2025-01-23 | Stop reason: HOSPADM

## 2025-01-23 RX ORDER — ONDANSETRON 4 MG/1
4 TABLET, FILM COATED ORAL EVERY 8 HOURS PRN
Qty: 20 TABLET | Refills: 0 | Status: SHIPPED | OUTPATIENT
Start: 2025-01-23

## 2025-01-23 RX ORDER — HYDROCODONE BITARTRATE AND ACETAMINOPHEN 5; 325 MG/1; MG/1
1 TABLET ORAL ONCE AS NEEDED
Status: DISCONTINUED | OUTPATIENT
Start: 2025-01-23 | End: 2025-01-23 | Stop reason: HOSPADM

## 2025-01-23 RX ORDER — BUPIVACAINE HYDROCHLORIDE AND EPINEPHRINE 5; 5 MG/ML; UG/ML
INJECTION, SOLUTION EPIDURAL; INTRACAUDAL; PERINEURAL
Status: COMPLETED | OUTPATIENT
Start: 2025-01-23 | End: 2025-01-23

## 2025-01-23 RX ORDER — IPRATROPIUM BROMIDE AND ALBUTEROL SULFATE 2.5; .5 MG/3ML; MG/3ML
3 SOLUTION RESPIRATORY (INHALATION) ONCE AS NEEDED
Status: DISCONTINUED | OUTPATIENT
Start: 2025-01-23 | End: 2025-01-23 | Stop reason: HOSPADM

## 2025-01-23 RX ORDER — EPHEDRINE SULFATE 50 MG/ML
INJECTION INTRAVENOUS AS NEEDED
Status: DISCONTINUED | OUTPATIENT
Start: 2025-01-23 | End: 2025-01-23 | Stop reason: SURG

## 2025-01-23 RX ORDER — DEXAMETHASONE SODIUM PHOSPHATE 4 MG/ML
INJECTION, SOLUTION INTRA-ARTICULAR; INTRALESIONAL; INTRAMUSCULAR; INTRAVENOUS; SOFT TISSUE AS NEEDED
Status: DISCONTINUED | OUTPATIENT
Start: 2025-01-23 | End: 2025-01-23 | Stop reason: SURG

## 2025-01-23 RX ORDER — EPHEDRINE SULFATE 50 MG/ML
5 INJECTION, SOLUTION INTRAVENOUS ONCE AS NEEDED
Status: DISCONTINUED | OUTPATIENT
Start: 2025-01-23 | End: 2025-01-23 | Stop reason: HOSPADM

## 2025-01-23 RX ORDER — PROMETHAZINE HYDROCHLORIDE 25 MG/1
25 TABLET ORAL ONCE AS NEEDED
Status: DISCONTINUED | OUTPATIENT
Start: 2025-01-23 | End: 2025-01-23 | Stop reason: HOSPADM

## 2025-01-23 RX ORDER — HYDROMORPHONE HYDROCHLORIDE 1 MG/ML
0.5 INJECTION, SOLUTION INTRAMUSCULAR; INTRAVENOUS; SUBCUTANEOUS
Status: DISCONTINUED | OUTPATIENT
Start: 2025-01-23 | End: 2025-01-23 | Stop reason: HOSPADM

## 2025-01-23 RX ORDER — ASPIRIN 81 MG/1
81 TABLET ORAL DAILY
Status: CANCELLED | OUTPATIENT
Start: 2025-01-24

## 2025-01-23 RX ORDER — LIDOCAINE HYDROCHLORIDE 10 MG/ML
0.5 INJECTION, SOLUTION INFILTRATION; PERINEURAL ONCE AS NEEDED
Status: DISCONTINUED | OUTPATIENT
Start: 2025-01-23 | End: 2025-01-23 | Stop reason: HOSPADM

## 2025-01-23 RX ORDER — NALOXONE HCL 0.4 MG/ML
0.2 VIAL (ML) INJECTION AS NEEDED
Status: DISCONTINUED | OUTPATIENT
Start: 2025-01-23 | End: 2025-01-23 | Stop reason: HOSPADM

## 2025-01-23 RX ORDER — PROMETHAZINE HYDROCHLORIDE 25 MG/1
25 SUPPOSITORY RECTAL ONCE AS NEEDED
Status: DISCONTINUED | OUTPATIENT
Start: 2025-01-23 | End: 2025-01-23 | Stop reason: HOSPADM

## 2025-01-23 RX ORDER — FLUMAZENIL 0.1 MG/ML
0.2 INJECTION INTRAVENOUS AS NEEDED
Status: DISCONTINUED | OUTPATIENT
Start: 2025-01-23 | End: 2025-01-23 | Stop reason: HOSPADM

## 2025-01-23 RX ORDER — SODIUM CHLORIDE, SODIUM LACTATE, POTASSIUM CHLORIDE, CALCIUM CHLORIDE 600; 310; 30; 20 MG/100ML; MG/100ML; MG/100ML; MG/100ML
9 INJECTION, SOLUTION INTRAVENOUS CONTINUOUS
Status: DISCONTINUED | OUTPATIENT
Start: 2025-01-23 | End: 2025-01-23 | Stop reason: HOSPADM

## 2025-01-23 RX ORDER — LIDOCAINE HYDROCHLORIDE 20 MG/ML
INJECTION, SOLUTION EPIDURAL; INFILTRATION; INTRACAUDAL; PERINEURAL AS NEEDED
Status: DISCONTINUED | OUTPATIENT
Start: 2025-01-23 | End: 2025-01-23 | Stop reason: SURG

## 2025-01-23 RX ORDER — LABETALOL HYDROCHLORIDE 5 MG/ML
5 INJECTION, SOLUTION INTRAVENOUS
Status: DISCONTINUED | OUTPATIENT
Start: 2025-01-23 | End: 2025-01-23 | Stop reason: HOSPADM

## 2025-01-23 RX ORDER — OXYCODONE AND ACETAMINOPHEN 7.5; 325 MG/1; MG/1
1 TABLET ORAL EVERY 4 HOURS PRN
Status: DISCONTINUED | OUTPATIENT
Start: 2025-01-23 | End: 2025-01-23 | Stop reason: HOSPADM

## 2025-01-23 RX ORDER — ONDANSETRON 2 MG/ML
INJECTION INTRAMUSCULAR; INTRAVENOUS AS NEEDED
Status: DISCONTINUED | OUTPATIENT
Start: 2025-01-23 | End: 2025-01-23 | Stop reason: SURG

## 2025-01-23 RX ORDER — TRANEXAMIC ACID 100 MG/ML
INJECTION, SOLUTION INTRAVENOUS AS NEEDED
Status: DISCONTINUED | OUTPATIENT
Start: 2025-01-23 | End: 2025-01-23 | Stop reason: SURG

## 2025-01-23 RX ORDER — HYDRALAZINE HYDROCHLORIDE 20 MG/ML
5 INJECTION INTRAMUSCULAR; INTRAVENOUS
Status: DISCONTINUED | OUTPATIENT
Start: 2025-01-23 | End: 2025-01-23 | Stop reason: HOSPADM

## 2025-01-23 RX ORDER — PROPOFOL 10 MG/ML
INJECTION, EMULSION INTRAVENOUS AS NEEDED
Status: DISCONTINUED | OUTPATIENT
Start: 2025-01-23 | End: 2025-01-23 | Stop reason: SURG

## 2025-01-23 RX ORDER — ROCURONIUM BROMIDE 10 MG/ML
INJECTION, SOLUTION INTRAVENOUS AS NEEDED
Status: DISCONTINUED | OUTPATIENT
Start: 2025-01-23 | End: 2025-01-23 | Stop reason: SURG

## 2025-01-23 RX ORDER — DIPHENHYDRAMINE HYDROCHLORIDE 50 MG/ML
12.5 INJECTION INTRAMUSCULAR; INTRAVENOUS
Status: DISCONTINUED | OUTPATIENT
Start: 2025-01-23 | End: 2025-01-23 | Stop reason: HOSPADM

## 2025-01-23 RX ORDER — SODIUM CHLORIDE 0.9 % (FLUSH) 0.9 %
3-10 SYRINGE (ML) INJECTION AS NEEDED
Status: DISCONTINUED | OUTPATIENT
Start: 2025-01-23 | End: 2025-01-23 | Stop reason: HOSPADM

## 2025-01-23 RX ORDER — ATROPINE SULFATE 0.4 MG/ML
0.4 INJECTION, SOLUTION INTRAMUSCULAR; INTRAVENOUS; SUBCUTANEOUS ONCE AS NEEDED
Status: DISCONTINUED | OUTPATIENT
Start: 2025-01-23 | End: 2025-01-23 | Stop reason: HOSPADM

## 2025-01-23 RX ORDER — FENTANYL CITRATE 50 UG/ML
50 INJECTION, SOLUTION INTRAMUSCULAR; INTRAVENOUS ONCE AS NEEDED
Status: COMPLETED | OUTPATIENT
Start: 2025-01-23 | End: 2025-01-23

## 2025-01-23 RX ORDER — ONDANSETRON 2 MG/ML
4 INJECTION INTRAMUSCULAR; INTRAVENOUS ONCE AS NEEDED
Status: DISCONTINUED | OUTPATIENT
Start: 2025-01-23 | End: 2025-01-23 | Stop reason: HOSPADM

## 2025-01-23 RX ADMIN — TRANEXAMIC ACID 1000 MG: 100 INJECTION, SOLUTION INTRAVENOUS at 07:33

## 2025-01-23 RX ADMIN — SODIUM CHLORIDE, POTASSIUM CHLORIDE, SODIUM LACTATE AND CALCIUM CHLORIDE: 600; 310; 30; 20 INJECTION, SOLUTION INTRAVENOUS at 08:24

## 2025-01-23 RX ADMIN — LIDOCAINE HYDROCHLORIDE 70 MG: 20 INJECTION, SOLUTION EPIDURAL; INFILTRATION; INTRACAUDAL; PERINEURAL at 07:28

## 2025-01-23 RX ADMIN — SODIUM CHLORIDE 2000 MG: 900 INJECTION INTRAVENOUS at 07:21

## 2025-01-23 RX ADMIN — HYDROMORPHONE HYDROCHLORIDE 0.5 MG: 1 INJECTION, SOLUTION INTRAMUSCULAR; INTRAVENOUS; SUBCUTANEOUS at 09:03

## 2025-01-23 RX ADMIN — BUPIVACAINE HYDROCHLORIDE AND EPINEPHRINE BITARTRATE 10 ML: 5; .0091 INJECTION, SOLUTION EPIDURAL; INTRACAUDAL; PERINEURAL at 07:10

## 2025-01-23 RX ADMIN — BUPIVACAINE 10 ML: 13.3 INJECTION, SUSPENSION, LIPOSOMAL INFILTRATION at 07:10

## 2025-01-23 RX ADMIN — FENTANYL CITRATE 50 MCG: 50 INJECTION, SOLUTION INTRAMUSCULAR; INTRAVENOUS at 07:08

## 2025-01-23 RX ADMIN — DEXAMETHASONE SODIUM PHOSPHATE 8 MG: 4 INJECTION, SOLUTION INTRAMUSCULAR; INTRAVENOUS at 07:28

## 2025-01-23 RX ADMIN — PROPOFOL 170 MG: 10 INJECTION, EMULSION INTRAVENOUS at 07:28

## 2025-01-23 RX ADMIN — ONDANSETRON 4 MG: 2 INJECTION INTRAMUSCULAR; INTRAVENOUS at 08:19

## 2025-01-23 RX ADMIN — ROCURONIUM BROMIDE 50 MG: 10 INJECTION, SOLUTION INTRAVENOUS at 07:28

## 2025-01-23 RX ADMIN — SUGAMMADEX 200 MG: 100 INJECTION, SOLUTION INTRAVENOUS at 08:24

## 2025-01-23 RX ADMIN — EPHEDRINE SULFATE 10 MG: 50 INJECTION INTRAVENOUS at 08:20

## 2025-01-23 NOTE — ANESTHESIA PREPROCEDURE EVALUATION
Anesthesia Evaluation     Patient summary reviewed and Nursing notes reviewed   no history of anesthetic complications:   NPO Solid Status: > 8 hours  NPO Liquid Status: > 2 hours           Airway   Mallampati: II  TM distance: >3 FB  Neck ROM: full  Dental      Pulmonary    (+) ,sleep apnea  Cardiovascular     ECG reviewed    (+) hypertension    ROS comment: EKG normal    Neuro/Psych  GI/Hepatic/Renal/Endo    (+) GERD    Musculoskeletal     Abdominal    Substance History      OB/GYN          Other                          Anesthesia Plan    ASA 3     general with block     intravenous induction     Anesthetic plan, risks, benefits, and alternatives have been provided, discussed and informed consent has been obtained with: patient.        CODE STATUS:

## 2025-01-23 NOTE — THERAPY DISCHARGE NOTE
Acute Care - Occupational Therapy Discharge  [facility]    Patient Name: Gricel Erwin  : 1950    MRN: [id number]                              Today's Date: 2025       Admit Date: 2025    Visit Dx:     ICD-10-CM ICD-9-CM   1. S/P reverse total shoulder arthroplasty, left  Z96.612 V43.61     Patient Active Problem List   Diagnosis    Lump or mass in breast    Abnormal mammogram    Abnormal ultrasound of breast    FH: ovarian cancer    Diffuse cystic mastopathy     Past Medical History:   Diagnosis Date    Allergies     Anxiety     Arthritis     Depression     GERD (gastroesophageal reflux disease)     History of COVID-19     History of pancreatitis     Hyperlipidemia     Hypertension     IBS (irritable bowel syndrome)     Insomnia     Left shoulder pain     Sleep apnea     WEARS MOUTH GUARD    Slow to wake up after anesthesia      Past Surgical History:   Procedure Laterality Date    ABDOMINOPLASTY  1980     SECTION       AND     CHOLECYSTECTOMY      CO2 LASER OF THE FACE  2014    LASER ON FACE FOR PRE-CANCER SPOTS     ELBOW PROCEDURE Left      AND  BROKEN  ELBOW AND SURGERY    FACIAL COSMETIC SURGERY      NECK AND FACE LIFT     HYSTERECTOMY      LIPOSUCTION      WITH TUMMY TUCK     REVISION OF SCAR  1988    THROAT SURGERY      LASER THROAT SURGERY    TOTAL KNEE ARTHROPLASTY Bilateral 2013    TOTAL SHOULDER REVERSE ARTHROPLASTY Right     TUBAL ABDOMINAL LIGATION  1981      General Information       Row Name 25 1352          OT Time and Intention    Subjective Information no complaints  -LE     Document Type evaluation;therapy note (daily note);discharge evaluation/summary  -LE     Mode of Treatment individual therapy;occupational therapy  -LE     Patient Effort good  -LE       Row Name 25 1358          General Information    Patient Profile Reviewed yes  -LE     Prior Level of Function independent:;transfer;ADL's  h/o R TSRA about 2  years ago.  -CRISTA     Existing Precautions/Restrictions fall;non-weight bearing  basic blue sling to L UE.  -LE     Barriers to Rehab none identified  -LE       Row Name 01/23/25 1352          Living Environment    People in Home spouse  -LE       Row Name 01/23/25 1352          Cognition    Orientation Status (Cognition) oriented x 4  -LE       Row Name 01/23/25 1352          Safety Issues/Impairments Affecting Functional Mobility    Comment, Safety Issues/Impairments (Mobility) Recommend helper walk along with pt while block in place to prevent bumping or swinging of surgery UE.  -CRISTA               User Key  (r) = Recorded By, (t) = Taken By, (c) = Cosigned By      Initials Name Provider Type    Danyell Grullon OTR Occupational Therapist                   Mobility/ADL's       Row Name 01/23/25 1353          Bed Mobility    Bed Mobility supine-sit  -LE     Supine-Sit [place] (Bed Mobility) standby assist  -LE       Row Name 01/23/25 1353          Transfers    Transfers stand-sit transfer;sit-stand transfer;bed-chair transfer;toilet transfer  -CRISTA     Comment, (Transfers) OT guards surgery UE during xfers to keep from swinging.  VC to back up until feel surface at back of legs.  -CRISTA       Row Name 01/23/25 1353          Bed-Chair Transfer    Bed-Chair [place] (Transfers) standby assist;verbal cues  -LE       Row Name 01/23/25 1353          Sit-Stand Transfer    Sit-Stand [place] (Transfers) standby assist;verbal cues  -LE       Row Name 01/23/25 1353          Stand-Sit Transfer    Stand-Sit [place] (Transfers) standby assist;verbal cues  -CRISTA       Row Name 01/23/25 1353          Toilet Transfer    Type (Toilet Transfer) stand pivot/stand step  -LE     [place] Level (Toilet Transfer) standby assist;verbal cues  -CRISTA     Assistive Device (Toilet Transfer) grab bars/safety frame  -LE       Row Name 01/23/25 1353          Functional Mobility    Functional Mobility- Ind. Level standby assist  -CRISTA      Functional Mobility- Comment SBA to bathroom to sink and then to recliner.  close SBA.  -LE       Row Name 01/23/25 1353          Activities of Daily Living    BADL Assessment/Intervention upper body dressing;lower body dressing;grooming;toileting  -LE       Row Name 01/23/25 1353          Lower Body Dressing Assessment/Training    [place] Level (Lower Body Dressing) don;pants/bottoms;shoes/slippers;socks;set up;verbal cues;moderate assist (50% patient effort)  -LE     Position (Lower Body Dressing) supported sitting;unsupported standing  -LE     Comment, (Lower Body Dressing) ed one hand tech for donning socks and hiking pants. review benefit of pull on pants and slip on shoes.  -       Row Name 01/23/25 [facility]          Upper Body Dressing Assessment/Training    [place] Level (Upper Body Dressing) don;front opening garment;pull-over garment;set up;verbal cues;moderate assist (50% patient effort)  -LE     Position (Upper Body Dressing) supported sitting  -LE     Comment, (Upper Body Dressing) Educate on threading tech for surgery arm first and to undress last.  Ed option to use pendulum position to thread sleeve once block has worn off.  ed how to jacky and doff sling and how to reposition as needed.pt donns tshirt and then jacket that zips up  -       Row Name 01/23/25 [facility]          Grooming Assessment/Training    Comment, (Grooming) ed option to use pendulum position to apply deodorant and wash axilla.  -       Row Name 01/23/25 [facility]          Toileting Assessment/Training    Comment, (Toileting) set up for hygiene.  -CRISTA               User Key  (r) = Recorded By, (t) = Taken By, (c) = Cosigned By      Initials Name Provider Type    Danyell Grullon OTR Occupational Therapist                   Obj/Interventions       Row Name 01/23/25 1352          Sensory Assessment (Somatosensory)    Sensory Assessment block to surgery UE.  -       Row Name 01/23/25 1352          Range of Motion Comprehensive     Comment, General Range of Motion little AROM in L fingers yet.  -       Row Name 01/23/25 1357          Shoulder (Therapeutic Exercise)    Shoulder (Therapeutic Exercise) pendulum exercises  OT demo and reviews pendulum ex. ed to also do hand and elbow AROM once block wears off.  -       Row Name 01/23/25 1357          Motor Skills    Therapeutic Exercise shoulder;elbow/forearm;wrist;hand  -LE               User Key  (r) = Recorded By, (t) = Taken By, (c) = Cosigned By      Initials Name Provider Type    Danyell Grullon OTR Occupational Therapist                   Goals/Plan       Row Name 01/23/25 1400          ROM Goal 1 (OT)    ROM Goal 1 (OT) Pt and family to verbalize understanding of HEP for UE.  -LE     Time Frame (ROM Goal 1, OT) 1 day  -LE     Progress/Outcome (ROM Goal 1, OT) goal met  -LE       Row Name 01/23/25 1400          Problem Specific Goal 1 (OT)    Problem Specific Goal 1 (OT) Pt and family to verbalized understanding of precautions, dressing tech with immobilized UE and purpose and position of sling.  -LE     Time Frame (Problem Specific Goal 1, OT) 1 day  -LE     Progress/Outcome (Problem Specific Goal 1, OT) goal met  -LE               User Key  (r) = Recorded By, (t) = Taken By, (c) = Cosigned By      Initials Name Provider Type    Danyell Grullon OTR Occupational Therapist                   Clinical Impression       Row Name 01/23/25 1358          Pain Assessment    Pretreatment Pain Rating 0/10 - no pain  -       Row Name 01/23/25 1358          Plan of Care Review    Plan of Care Reviewed With patient;spouse  -LE     Outcome Evaluation Pt is POD#O L TSRA.  History includes R TSRA.    Pt seen by OT for review of precautions and education on dressing tech and how to jacky/doff sling.  Issue and demo shoulder HEP and return demo by pt. using non surgery UE & OT demo pendulum.   Ed complete HEP 1X10, 5-6 times a day and to hold exercises on own until block worn off & full sensation  returned.   Pt plans to d/c home today with assist of spouse who is present for education and has assisted pt after prior shoulder surgery.  -CRISTA       Row Name 01/23/25 1358          Therapy Assessment/Plan (OT)    Therapy Frequency (OT) evaluation only  -CRISTA       Row Name 01/23/25 1358          Therapy Plan Review/Discharge Plan (OT)    Anticipated Discharge Disposition (OT) home with assist  OP PT per ortho MD  -CRISTA       Row Name 01/23/25 1358          Vital Signs    Pre Patient Position Supine  -LE     Intra Patient Position Standing  -LE     Post Patient Position Sitting  -CRISTA       Row Name 01/23/25 1358          Positioning and Restraints    Pre-Treatment Position in bed  -LE     Post Treatment Position chair  -LE     In Chair notified nsg;reclined;sitting;encouraged to call for assist;with family/caregiver;LUE elevated  -LE               User Key  (r) = Recorded By, (t) = Taken By, (c) = Cosigned By      Initials Name Provider Type    Danyell Grullon OTR Occupational Therapist                   Outcome Measures       Row Name 01/23/25 1400          How much help from another is currently needed...    Putting on and taking off regular lower body clothing? 2  -LE     Bathing (including washing, rinsing, and drying) 3  -LE     Toileting (which includes using toilet bed pan or urinal) 3  -LE     Putting on and taking off regular upper body clothing 2  -LE     Taking care of personal grooming (such as brushing teeth) 3  -LE     Eating meals 3  -LE     AM-PAC 6 Clicks Score (OT) 16  -LE       Row Name 01/23/25 1400          Functional Assessment    Outcome Measure Options AM-PAC 6 Clicks Daily Activity (OT)  -LE               User Key  (r) = Recorded By, (t) = Taken By, (c) = Cosigned By      Initials Name Provider Type    Danyell Grullon OTR Occupational Therapist                  Occupational Therapy Education       Title: PT OT SLP Therapies (Done)       Topic: Occupational Therapy (Done)       Point: ADL  training (Done)       Description:   Instruct learner(s) on proper safety adaptation and remediation techniques during self care or transfers.   Instruct in proper use of assistive devices.                  Learning Progress Summary            Patient Acceptance, TB,D,H,E, VU,DU by CRISTA at 1/23/2025 1401    Comment: role of OT, xfer training, precuations, HEP   Family Acceptance, TB,D,H,E, VU,DU by CRISTA at 1/23/2025 1401    Comment: role of OT, xfer training, precuations, HEP                      Point: Home exercise program (Done)       Description:   Instruct learner(s) on appropriate technique for monitoring, assisting and/or progressing therapeutic exercises/activities.                  Learning Progress Summary            Patient Acceptance, TB,D,H,E, VU,DU by CRISTA at 1/23/2025 1401    Comment: role of OT, xfer training, precuations, HEP   Family Acceptance, TB,D,H,E, VU,DU by CRISTA at 1/23/2025 1401    Comment: role of OT, xfer training, precuations, HEP                      Point: Precautions (Done)       Description:   Instruct learner(s) on prescribed precautions during self-care and functional transfers.                  Learning Progress Summary            Patient Acceptance, TB,D,H,E, VU,DU by CRISTA at 1/23/2025 1401    Comment: role of OT, xfer training, precuations, HEP   Family Acceptance, TB,D,H,E, VU,DU by CRISTA at 1/23/2025 1401    Comment: role of OT, xfer training, precuations, HEP                      Point: Body mechanics (Done)       Description:   Instruct learner(s) on proper positioning and spine alignment during self-care, functional mobility activities and/or exercises.                  Learning Progress Summary            Patient Acceptance, TB,D,H,E, VU,DU by CRISTA at 1/23/2025 1401    Comment: role of OT, xfer training, precuations, HEP   Family Acceptance, TB,D,H,E, VU,DU by CRISTA at 1/23/2025 1401    Comment: role of OT, xfer training, precuations, HEP                                      User Key        Initials Effective Dates Name Provider Type Discipline     06/16/21 -  Danyell Mccrary, OTR Occupational Therapist OT                  OT Recommendation and Plan  Therapy Frequency (OT): evaluation only  Plan of Care Review  Plan of Care Reviewed With: patient, spouse  Outcome Evaluation: Pt is POD#O L TSRA.  History includes R TSRA.    Pt seen by OT for review of precautions and education on dressing tech and how to jacky/doff sling.  Issue and demo shoulder HEP and return demo by pt. using non surgery UE & OT demo pendulum.   Ed complete HEP 1X10, 5-6 times a day and to hold exercises on own until block worn off & full sensation returned.   Pt plans to d/c home today with assist of spouse who is present for education and has assisted pt after prior shoulder surgery.  Plan of Care Reviewed With: patient, spouse  Outcome Evaluation: Pt is POD#O L TSRA.  History includes R TSRA.    Pt seen by OT for review of precautions and education on dressing tech and how to jacky/doff sling.  Issue and demo shoulder HEP and return demo by pt. using non surgery UE & OT demo pendulum.   Ed complete HEP 1X10, 5-6 times a day and to hold exercises on own until block worn off & full sensation returned.   Pt plans to d/c home today with assist of spouse who is present for education and has assisted pt after prior shoulder surgery.     Time Calculation:   Evaluation Complexity (OT)  Review Occupational Profile/Medical/Therapy History Complexity: brief/low complexity  Assessment, Occupational Performance/Identification of Deficit Complexity: 1-3 performance deficits  Clinical Decision Making Complexity (OT): problem focused assessment/low complexity  Overall Complexity of Evaluation (OT): low complexity     Time Calculation- OT       Row Name 01/23/25 1402             Time Calculation- OT    OT Start Time 1218  -LE      OT Stop Time 1238  -LE      OT Time Calculation (min) 20 min  -LE      Total Timed Code Minutes- OT 15 minute(s)  -LE       OT Received On 01/23/25  -LE         Timed Charges    16050 - OT Self Care/Mgmt Minutes 15  -LE         Total Minutes    Timed Charges Total Minutes 15  -LE       Total Minutes 15  -LE                User Key  (r) = Recorded By, (t) = Taken By, (c) = Cosigned By      Initials Name Provider Type    Danyell Grullon OTR Occupational Therapist                  Therapy Charges for Today       Code Description Service Date Service Provider Modifiers Qty    [id number]  OT SELF CARE/MGMT/TRAIN EA 15 MIN 1/23/2025 Danyell Mccrary OTR GO 1    [id number]  OT EVAL LOW COMPLEXITY 2 1/23/2025 Danyell Mccrary OTR GO 1               OT Discharge Summary  Anticipated Discharge Disposition (OT): home with assist  Reason for Discharge: Discharge from facility, All goals achieved  Discharge Destination: Home with assist    ROBIN Avendaño  1/23/2025

## 2025-01-23 NOTE — ANESTHESIA PROCEDURE NOTES
Airway  Urgency: elective    Date/Time: 1/23/2025 7:31 AM  Airway not difficult    General Information and Staff    Patient location during procedure: OR  Anesthesiologist: Dong Allan MD  CRNA/CAA: Linette Watson CRNA    Indications and Patient Condition  Indications for airway management: airway protection    Preoxygenated: yes  MILS not maintained throughout  Mask difficulty assessment: 1 - vent by mask    Final Airway Details  Final airway type: endotracheal airway      Successful airway: ETT  Cuffed: yes   Successful intubation technique: direct laryngoscopy  Facilitating devices/methods: intubating stylet and anterior pressure/BURP  Endotracheal tube insertion site: oral  Blade: Isi  Blade size: 3  ETT size (mm): 7.0  Cormack-Lehane Classification: grade IIa - partial view of glottis  Placement verified by: chest auscultation and capnometry   Measured from: lips  ETT/EBT  to lips (cm): 21  Number of attempts at approach: 1  Assessment: lips, teeth, and gum same as pre-op and atraumatic intubation

## 2025-01-23 NOTE — PLAN OF CARE
Goal Outcome Evaluation:  Plan of Care Reviewed With: patient, spouse           Outcome Evaluation: Pt is POD#O L TSRA.  History includes R TSRA.    Pt seen by OT for review of precautions and education on dressing tech and how to jacky/doff sling.  Issue and demo shoulder HEP and return demo by pt. using non surgery UE & OT demo pendulum.   Ed complete HEP 1X10, 5-6 times a day and to hold exercises on own until block worn off & full sensation returned.   Pt plans to d/c home today with assist of spouse who is present for education and has assisted pt after prior shoulder surgery.    Anticipated Discharge Disposition (OT): home with assist (OP PT per ortho MD)

## 2025-01-23 NOTE — OP NOTE
Date: 1/23/2025  Time: 08:43 EST TOTAL SHOULDER REVERSE ARTHROPLASTY  Procedure Note    Campbell Erwin  1/23/2025    Pre-op Diagnosis:    Rotator Cuff Arthropathy Left Shoulder    Post-op Diagnosis   Rotator Cuff Arthropathy Left Shoulder    Procedure:  Left  Reverse total shoulder arthroplasty     Surgeon: Barber Santamaria M.D.    Surgical assistant: CAMPBELL GALDAMEZ      Anesthesia: General with Block  Anesthesiologist: Dong Allan MD  CRNA: Linette Watson CRNA    Staff:   Circulator: Tara Roman RN  Scrub Person: Emily Valentine  Vendor Representative: Javier Queen ([facility])  Assistant: Campbell Galdamez APRN    Indication for Asst.  A surgical assistant, CAMPBELL GALDAMEZ, was utilized as a medical necessity and was present through the entire procedure allowing safe completion of the procedure by helping with exposure and placement of implants as well as reducing overall operative time and morbidity for the patient.    Estimated Blood Loss: 200 ml    Specimens:   Order Name Source Comment Collection Info Order Time   HEMOGLOBIN AND HEMATOCRIT, BLOOD    1/23/2025  8:43 AM     Release to patient   Routine Release            Complications: None     Implant specifics:  Implant Name Type Inv. Item Serial No.  Lot No. LRB No. Used Action   SCRW EQUINOXE TORQ DEFINE REV SHLDR KT - [id number] - [id number] Implant SCRW EQUINOXE TORQ DEFINE REV SHLDR KT [id number] EXACTECH NA Left 1 Implanted   SCRW LK EQUINOXE GLENOSPHERE REV/SHLDR - [id number] - [id number] Implant SCRW LK EQUINOXE GLENOSPHERE REV/SHLDR [id number] EXACTECH NA Left 1 Implanted   BASEPLT REV SHLDR EQUINOXE SM - [id number] - [id number] Implant BASEPLT REV SHLDR EQUINOXE SM [id number] EXACTECH NA Left 1 Implanted   SCRW COMPR EQUINOXE LK 4.5X18MM - [id number] - [id number] Implant SCRW COMPR EQUINOXE LK 4.5X18MM [id number] EXACTECH NA Left 1  Implanted   SCRW COMPR EQUINOXE LK 4.5X22MM - [id number] - [id number] Implant SCRW COMPR EQUINOXE LK 4.5X22MM [id number] EXACTECH NA Left 1 Implanted   LINER HUM/SHLDR EQUINOXE/REV PE 145DEG 36MM PLS0 - [id number] - [id number] Implant LINER HUM/SHLDR EQUINOXE/REV PE 145DEG 36MM PLS0 [id number] EXACTECH NA Left 1 Implanted   TRY ADAPT HUM/SHLDR EQUINOXE FOR/REV/TOTL PLS0 - [id number] - [id number] Implant TRY ADAPT HUM/SHLDR EQUINOXE FOR/REV/TOTL PLS0 [id number] EXACTECH NA Left 1 Implanted   SCRW COMPR EQUINOXE LK 4.5X18MM - [id number] - [id number] Implant SCRW COMPR EQUINOXE LK 4.5X18MM [id number] EXACTECH NA Left 1 Implanted   SCRW COMPR EQUINOXE LK 4.5X26MM - [id number] - [id number] Implant SCRW COMPR EQUINOXE LK 4.5X26MM [id number] EXACTECH NA Left 1 Implanted   STEM HUM EQUINOXE PRESERVE 7MM - [id number] - [id number] Implant STEM HUM EQUINOXE PRESERVE 7MM [id number] EXACTECH NA Left 1 Implanted   GLENOSPHERE REV SHLDR EQUINOXE 36MM SM - [id number] - [id number] Implant GLENOSPHERE REV SHLDR EQUINOXE 36MM SM [id number] EXACTECH NA Left 1 Implanted       SURGICAL APPROACH: Deltopectoral      SURGICAL TECHNIQUE: Peel off         Procedure: The patient was taken to the operative suite.  After adequate anesthesia was established the upper extremity was prepped and draped in the usual fashion.  The head was placed in a neutral position and bony prominences were padded.  Ioban was utilized covering the skin over the shoulder and axilla.  Preoperative antibiotics and transexamic acid were confirmed.  An anterior incision was made starting lateral to the coracoid towards the axillary crease through skin and subcutaneous tissues.  The deltopectoral interval was entered.  The cephalic vein was identified, preserved, and retracted laterally.  The conjoined tendon was reflected medially.  The biceps tendon  was identified near the pectoralis insertion site.  A small portion of the pectoralis tendon was released and then the biceps tendon was tenodesed to the pectoralis tendon insertion on the humeral.  The biceps tendon was then tenotomized more proximally.  The subscapularis was peeled off the lesser tuberosity and tagged.  Arthropathic changes were noted in the glenohumeral joint.  The rotator cuff was significantly torn and fragmented.  Debridement was carried out to remove torn impinging tissues. The capsule was released off the humeral neck and the posterior soft tissue attachments were protected. An external cutting guide was utilized and the head was cut at a 20° retroverted angle.  Excess osteophytes were excised with a rongeur.  Sequential impaction broaching was carried out using the Preserve system broaches.  The final broach was left in place for later trialing.   I then visualized the glenoid and retractors were placed starting posteriorly and superiorly.  The capsule was reflected off the deep surface of the subscapularis.  An anterior retractor was placed.  The labrum was then circumferentially excised off of the glenoid.  The biceps stump was released and removed as well.  A careful capsular release off the inferior glenoid was performed protecting the axillary nerve and vasculature.  This exposed the scapular pillar.    This allowed for visualization of the glenoid.  The preoperative plan demonstrated but 8 degrees of retroversion however corrected with minimal reaming to about 6 degrees retroversion and still Preserve the ball.  Utilizing this plan I felt that we could use standard guides to complete the procedure versus the intraoperative guidance that required holes in the coracoid.  A guide was used to drill a K wire through the central aspect of the glenoid.   Sequential reaming was carried out at the prescribed angle and to the prescribed depth based on the preoperative plan.   A central hole was  then drilled for the cage using a guide.  This assured that we were within the vault of the glenoid and preserved as much bone as possible during reaming.    Autogenous bone graft was harvested from the humeral head and packed into the cage of the baseplate.  The baseplate was then compressed onto the glenoid.  Rotation was checked.  Drill holes were then placed for compression screws through the baseplate.  These were then placed to the appropriate depth with good compression and then locking caps were placed over the screw heads.  Good baseplate placement and stable fixation were noted.  An appropriate size glenosphere based on the preoperative plan and intraoperative visualization of the anatomy was chosen and fixed to the baseplate with a central compression screw.  I then trialed the humeral tray and polyethylene.  I was looking for smooth nonimpinged range of motion.  I also wanted to assure good stability by placing traction on the arm and also checking for lateral stability.  Once satisfied with range of motion and stability I removed the humeral components.  I pulse lavaged the proximal humerus and press-fit the appropriate size stem with good purchase and fixation.  Next I re-trialed the humeral tray and humeral liner.  When I was satisfied with range of motion and stability I removed the trial liner and tray.  The appropriate humeral tray was then placed and held with a torque limiting screw.  The chosen polyethylene was then inserted and compressed into place and the shoulder was reduced.  Subdeltoid scar tissue was excised.  Good range of motion was noted.  Good stability was noted.  Vigorous irrigation and suctioning was performed.  The subscapularis was not repaired.  The deltopectoral interval was closed with 0 Vicryl.  The subcutaneous tissues were closed with 2-0 Vicryl.  The skin was closed with a zip line device.  The patient had a sterile compression dressing applied and was awoken and taken to  the postanesthetic recovery unit in good condition.    Barber Santamaria MD     Date: 1/23/2025  Time: 08:43 EST

## 2025-01-23 NOTE — DISCHARGE INSTRUCTIONS
Dr. Barber Santamaria  [address] [address]  [phone]      Outpatient REVERSE TOTAL SHOULDER REPLACEMENT  HOSPITAL DISCHARGE INSTRUCTIONS         GENERAL INFORMATION: With improved surgical techniques for total shoulder and reverse total shoulder replacement and the Yazidism of ultrasound guided long acting nerve blocks, the hospital stay for patients has decreased significantly.  Many people can go home right after surgery as an outpatient.      SPECIFIC POST-OP INSTRUCTIONS:    * FOLLOW UP APPOINTMENT: You should have been given an appointment to follow up 10-14 days after your date of surgery. We can see you back sooner if there are any problems or concerns.     * BLOOD THINNERS: All patients will be on some type of blood thinner post-op to prevent blood clot. Most patients who are not already on a blood thinner are discharged on over-the-counter aspirin 81 mg or 325mg daily which you will take for three weeks. If you were taking a blood thinner prior to surgery, we will have you resume this on the first day post-op.     * ICE: Ice helps to decrease both pain and swelling. Ice should be applied to the shoulder 20-25 minutes each hour while awake.      DRESSING CHANGES: We ask that you keep the incision clean and dry.  Your surgical dressing can be removed 48 hours after surgery.  There will be a yellow strip of gauze and a Zipline device that will be underneath the dressing.  The yellow gauze can be removed but leave the Zipline alone.  You can then apply a watertight dressing over the Zipline to protect it.  You can get this type of dressing from the hospital before you leave or at your local pharmacy. SHOWERING: The wound edges typically come together and seal by 3-5 days post-op if there is no drainage. Again, please keep the same waterproof dressing on. DO NOT SUBMERSE SHOULDER IN POOL,HOT TUB OR BATH UNTIL AT LEAST 3-4 WEEKS POST-OP (EVEN WITH WATERPROOF BANDAIDS).    * PAIN  MEDICINE: You will be  given a prescription for oral pain medication prior to discharge from the hospital. Please let us know if you have a sensitivity to certain pain medications prior to discharge. Additional pain medication prescriptions can be called into your pharmacy during normal business hours. NO medications can be called in over the weekends or after business hours.     * ORAL ANTI-INFLAMMATORIES:   You will be asked to discontinue ALL oral anti-inflammatories prior to surgery. You can resume these the first day post-op.These can be combined with the oral pain medications safely. DO NOT TAKE ADDITIONAL TYLENOL WITH THE NARCOTIC PAIN MEDICATION (it already has Tylenol in it). If you were not taking an anti-inflammatory pre-op, you can start one on the first day post-op. It will help decrease pain and swelling. Typical medications and dosages are as follows:   Advil/Motrin/Ibuprofen 200mg, 4 pills every 8 hours   Aleve/Naproxen Sodium 220mg, 2 pills every 12 hours    * SLING: We recommend you wear the sling at all times, other than when showering or doing physical therapy exercises.      * WEIGHT BEARING: We ask that you be non-weight bearing on the operative shoulder. Do not use the operative arm to lift/push/pull greater than 5lbs.     * PHYSICAL THERAPY: Before you leave the hospital staff will teach you some very gentle range of motion exercises to do at home for the first 10-14 days. After we see you in the office during your first post-op visit, we will give you a prescription to start formal physical therapy. This can be done downstairs at LOC  or at a location of your choice. Physical therapy is typically 2-3 times a week for 4-6 weeks, depending on the individual and their progress.     * DRIVING A CAR: Medically/legally we can not recommend you drive a car while in the sling or while on pain medication (roughly 10-14 days).     * RETURNING TO DAILY AND RECREATIONAL ACTIVITIES: For the most part patients can progress to  "daily activities as tolerated (keeping in mind the restrictions listed above). Initially, we do not want you to \"overdo\" it in an attempt to minimize post-op pain and swelling. Once the swelling is controlled, you can progress with activities as tolerated. Pain and swelling should be your guide to increasing your activity level.     * RETURN TO WORK: The return to work date depends on many factors and is very dependent on the individual. You would most likely be able to return to a sedentary job after your first post-op visit (10-14 days after surgery). A more physical job would obviously require a longer recovery time before return to work.  "

## 2025-01-27 ENCOUNTER — TELEPHONE (OUTPATIENT)
Dept: ORTHOPEDIC SURGERY | Facility: HOSPITAL | Age: 75
End: 2025-01-27
Payer: MEDICARE

## 2025-01-27 NOTE — TELEPHONE ENCOUNTER
Attempted to reach Ms. Erwin to see how she is doing as she is POD 4 LTSRA. Message left at this time.

## 2025-02-20 ENCOUNTER — TELEPHONE (OUTPATIENT)
Dept: ORTHOPEDIC SURGERY | Facility: HOSPITAL | Age: 75
End: 2025-02-20
Payer: MEDICARE

## 2025-02-20 NOTE — TELEPHONE ENCOUNTER
Attempted to reach Ms. Erwin to see how she has been doing since her LTSRA 1/23. Message left at this time.

## 2025-05-30 ENCOUNTER — TRANSCRIBE ORDERS (OUTPATIENT)
Dept: ADMINISTRATIVE | Facility: HOSPITAL | Age: 75
End: 2025-05-30
Payer: MEDICARE

## 2025-05-30 ENCOUNTER — LAB (OUTPATIENT)
Dept: LAB | Facility: HOSPITAL | Age: 75
End: 2025-05-30
Payer: MEDICARE

## 2025-05-30 DIAGNOSIS — M25.512 LEFT SHOULDER PAIN, UNSPECIFIED CHRONICITY: Primary | ICD-10-CM

## 2025-05-30 DIAGNOSIS — M25.512 LEFT SHOULDER PAIN, UNSPECIFIED CHRONICITY: ICD-10-CM

## 2025-05-30 LAB
BASOPHILS # BLD AUTO: 0.05 10*3/MM3 (ref 0–0.2)
BASOPHILS NFR BLD AUTO: 0.9 % (ref 0–1.5)
CRP SERPL-MCNC: <0.3 MG/DL (ref 0–0.5)
DEPRECATED RDW RBC AUTO: 50.3 FL (ref 37–54)
EOSINOPHIL # BLD AUTO: 0.13 10*3/MM3 (ref 0–0.4)
EOSINOPHIL NFR BLD AUTO: 2.4 % (ref 0.3–6.2)
ERYTHROCYTE [DISTWIDTH] IN BLOOD BY AUTOMATED COUNT: 15.6 % (ref 12.3–15.4)
ERYTHROCYTE [SEDIMENTATION RATE] IN BLOOD: 7 MM/HR (ref 0–30)
HCT VFR BLD AUTO: 36.5 % (ref 34–46.6)
HGB BLD-MCNC: 10.8 G/DL (ref 12–15.9)
IMM GRANULOCYTES # BLD AUTO: 0.01 10*3/MM3 (ref 0–0.05)
IMM GRANULOCYTES NFR BLD AUTO: 0.2 % (ref 0–0.5)
LYMPHOCYTES # BLD AUTO: 1.91 10*3/MM3 (ref 0.7–3.1)
LYMPHOCYTES NFR BLD AUTO: 35 % (ref 19.6–45.3)
MCH RBC QN AUTO: 26 PG (ref 26.6–33)
MCHC RBC AUTO-ENTMCNC: 29.6 G/DL (ref 31.5–35.7)
MCV RBC AUTO: 88 FL (ref 79–97)
MONOCYTES # BLD AUTO: 0.72 10*3/MM3 (ref 0.1–0.9)
MONOCYTES NFR BLD AUTO: 13.2 % (ref 5–12)
NEUTROPHILS NFR BLD AUTO: 2.64 10*3/MM3 (ref 1.7–7)
NEUTROPHILS NFR BLD AUTO: 48.3 % (ref 42.7–76)
NRBC BLD AUTO-RTO: 0 /100 WBC (ref 0–0.2)
PLATELET # BLD AUTO: 344 10*3/MM3 (ref 140–450)
PMV BLD AUTO: 9.9 FL (ref 6–12)
RBC # BLD AUTO: 4.15 10*6/MM3 (ref 3.77–5.28)
WBC NRBC COR # BLD AUTO: 5.46 10*3/MM3 (ref 3.4–10.8)

## 2025-05-30 PROCEDURE — 36415 COLL VENOUS BLD VENIPUNCTURE: CPT

## 2025-05-30 PROCEDURE — 83529 ASAY OF INTERLEUKIN-6 (IL-6): CPT

## 2025-05-30 PROCEDURE — 86140 C-REACTIVE PROTEIN: CPT

## 2025-05-30 PROCEDURE — 85652 RBC SED RATE AUTOMATED: CPT

## 2025-05-30 PROCEDURE — 85025 COMPLETE CBC W/AUTO DIFF WBC: CPT

## 2025-06-02 LAB — IL6 SERPL-MCNC: 2.8 PG/ML (ref 0–13)

## 2025-06-26 ENCOUNTER — APPOINTMENT (OUTPATIENT)
Dept: GENERAL RADIOLOGY | Facility: HOSPITAL | Age: 75
End: 2025-06-26
Payer: MEDICARE

## 2025-06-26 ENCOUNTER — HOSPITAL ENCOUNTER (OUTPATIENT)
Facility: HOSPITAL | Age: 75
Setting detail: OBSERVATION
Discharge: HOME | End: 2025-06-27
Attending: EMERGENCY MEDICINE | Admitting: EMERGENCY MEDICINE
Payer: MEDICARE

## 2025-06-26 ENCOUNTER — APPOINTMENT (OUTPATIENT)
Dept: CT IMAGING | Facility: HOSPITAL | Age: 75
End: 2025-06-26
Payer: MEDICARE

## 2025-06-26 DIAGNOSIS — S42.201A CLOSED FRACTURE OF PROXIMAL END OF RIGHT HUMERUS, UNSPECIFIED FRACTURE MORPHOLOGY, INITIAL ENCOUNTER: ICD-10-CM

## 2025-06-26 DIAGNOSIS — S42.291A OTHER CLOSED DISPLACED FRACTURE OF PROXIMAL END OF RIGHT HUMERUS, INITIAL ENCOUNTER: ICD-10-CM
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
## 2025-06-26 PROBLEM — S42.309A HUMERUS FRACTURE: Status: ACTIVE | Noted: 2025-06-26

## 2025-06-26 PROCEDURE — 73030 X-RAY EXAM OF SHOULDER: CPT

## 2025-06-26 PROCEDURE — 25010000002 KETOROLAC TROMETHAMINE PER 15 MG: Performed by: NURSE PRACTITIONER

## 2025-06-26 PROCEDURE — 25010000002 HYDROMORPHONE PER 4 MG: Performed by: NURSE PRACTITIONER

## 2025-06-26 PROCEDURE — G0378 HOSPITAL OBSERVATION PER HR: HCPCS

## 2025-06-26 PROCEDURE — 96376 TX/PRO/DX INJ SAME DRUG ADON: CPT

## 2025-06-26 PROCEDURE — 99285 EMERGENCY DEPT VISIT HI MDM: CPT

## 2025-06-26 PROCEDURE — 73200 CT UPPER EXTREMITY W/O DYE: CPT

## 2025-06-26 PROCEDURE — 25010000002 MORPHINE PER 10 MG: Performed by: PHYSICIAN ASSISTANT

## 2025-06-26 PROCEDURE — 96374 THER/PROPH/DIAG INJ IV PUSH: CPT

## 2025-06-26 PROCEDURE — 25010000002 MORPHINE PER 10 MG: Performed by: NURSE PRACTITIONER

## 2025-06-26 PROCEDURE — 96375 TX/PRO/DX INJ NEW DRUG ADDON: CPT

## 2025-06-26 RX ORDER — ALUMINA, MAGNESIA, AND SIMETHICONE 2400; 2400; 240 MG/30ML; MG/30ML; MG/30ML
15 SUSPENSION ORAL EVERY 6 HOURS PRN
Status: DISCONTINUED | OUTPATIENT
Start: 2025-06-26 | End: 2025-06-27 | Stop reason: HOSPADM

## 2025-06-26 RX ORDER — HYDROMORPHONE HYDROCHLORIDE 1 MG/ML
0.5 INJECTION, SOLUTION INTRAMUSCULAR; INTRAVENOUS; SUBCUTANEOUS ONCE
Refills: 0 | Status: COMPLETED | OUTPATIENT
Start: 2025-06-26 | End: 2025-06-26

## 2025-06-26 RX ORDER — BISACODYL 10 MG
10 SUPPOSITORY, RECTAL RECTAL DAILY PRN
Status: DISCONTINUED | OUTPATIENT
Start: 2025-06-26 | End: 2025-06-27 | Stop reason: HOSPADM

## 2025-06-26 RX ORDER — ONDANSETRON 2 MG/ML
4 INJECTION INTRAMUSCULAR; INTRAVENOUS EVERY 6 HOURS PRN
Status: DISCONTINUED | OUTPATIENT
Start: 2025-06-26 | End: 2025-06-27 | Stop reason: HOSPADM

## 2025-06-26 RX ORDER — SODIUM CHLORIDE 9 MG/ML
40 INJECTION, SOLUTION INTRAVENOUS AS NEEDED
Status: DISCONTINUED | OUTPATIENT
Start: 2025-06-26 | End: 2025-06-27 | Stop reason: HOSPADM

## 2025-06-26 RX ORDER — BISACODYL 5 MG/1
5 TABLET, DELAYED RELEASE ORAL DAILY PRN
Status: DISCONTINUED | OUTPATIENT
Start: 2025-06-26 | End: 2025-06-27 | Stop reason: HOSPADM

## 2025-06-26 RX ORDER — SODIUM CHLORIDE 0.9 % (FLUSH) 0.9 %
10 SYRINGE (ML) INJECTION EVERY 12 HOURS SCHEDULED
Status: DISCONTINUED | OUTPATIENT
Start: 2025-06-26 | End: 2025-06-27 | Stop reason: HOSPADM

## 2025-06-26 RX ORDER — NITROGLYCERIN 0.4 MG/1
0.4 TABLET SUBLINGUAL
Status: DISCONTINUED | OUTPATIENT
Start: 2025-06-26 | End: 2025-06-27 | Stop reason: HOSPADM

## 2025-06-26 RX ORDER — ONDANSETRON 4 MG/1
4 TABLET, ORALLY DISINTEGRATING ORAL EVERY 6 HOURS PRN
Status: DISCONTINUED | OUTPATIENT
Start: 2025-06-26 | End: 2025-06-27 | Stop reason: HOSPADM

## 2025-06-26 RX ORDER — POLYETHYLENE GLYCOL 3350 17 G/17G
17 POWDER, FOR SOLUTION ORAL DAILY PRN
Status: DISCONTINUED | OUTPATIENT
Start: 2025-06-26 | End: 2025-06-27 | Stop reason: HOSPADM

## 2025-06-26 RX ORDER — SODIUM CHLORIDE 0.9 % (FLUSH) 0.9 %
10 SYRINGE (ML) INJECTION AS NEEDED
Status: DISCONTINUED | OUTPATIENT
Start: 2025-06-26 | End: 2025-06-27 | Stop reason: HOSPADM

## 2025-06-26 RX ORDER — KETOROLAC TROMETHAMINE 30 MG/ML
30 INJECTION, SOLUTION INTRAMUSCULAR; INTRAVENOUS ONCE
Status: COMPLETED | OUTPATIENT
Start: 2025-06-26 | End: 2025-06-26

## 2025-06-26 RX ORDER — AMOXICILLIN 250 MG
2 CAPSULE ORAL 2 TIMES DAILY PRN
Status: DISCONTINUED | OUTPATIENT
Start: 2025-06-26 | End: 2025-06-27 | Stop reason: HOSPADM

## 2025-06-26 RX ADMIN — Medication 10 ML: at 20:39

## 2025-06-26 RX ADMIN — Medication 6.8 MG: at 16:16

## 2025-06-26 RX ADMIN — MORPHINE SULFATE 4 MG: 4 INJECTION, SOLUTION INTRAMUSCULAR; INTRAVENOUS at 14:46

## 2025-06-26 RX ADMIN — HYDROMORPHONE HYDROCHLORIDE 0.5 MG: 1 INJECTION, SOLUTION INTRAMUSCULAR; INTRAVENOUS; SUBCUTANEOUS at 14:01

## 2025-06-26 RX ADMIN — HYDROMORPHONE HYDROCHLORIDE 0.5 MG: 1 INJECTION, SOLUTION INTRAMUSCULAR; INTRAVENOUS; SUBCUTANEOUS at 12:40

## 2025-06-26 RX ADMIN — KETOROLAC TROMETHAMINE 30 MG: 30 INJECTION INTRAMUSCULAR; INTRAVENOUS at 15:16

## 2025-06-26 RX ADMIN — MORPHINE SULFATE 4 MG: 4 INJECTION, SOLUTION INTRAMUSCULAR; INTRAVENOUS at 20:30

## 2025-06-26 RX ADMIN — HYDROMORPHONE HYDROCHLORIDE 0.5 MG: 1 INJECTION, SOLUTION INTRAMUSCULAR; INTRAVENOUS; SUBCUTANEOUS at 13:19

## 2025-06-26 NOTE — PLAN OF CARE
Problem: Adult Inpatient Plan of Care  Goal: Plan of Care Review  Outcome: Progressing  Flowsheets (Taken 6/26/2025 1751)  Plan of Care Reviewed With:   patient   family  Goal: Patient-Specific Goal (Individualized)  Outcome: Progressing  Goal: Absence of Hospital-Acquired Illness or Injury  Outcome: Progressing  Intervention: Identify and Manage Fall Risk  Recent Flowsheet Documentation  Taken 6/26/2025 1726 by Constanza Pruitt RN  Safety Promotion/Fall Prevention:   activity supervised   assistive device/personal items within reach   clutter free environment maintained   fall prevention program maintained   nonskid shoes/slippers when out of bed   room organization consistent   safety round/check completed  Intervention: Prevent Skin Injury  Recent Flowsheet Documentation  Taken 6/26/2025 1726 by Constanza Pruitt RN  Body Position:   turned   left   supine   weight shifting  Skin Protection: pulse oximeter probe site changed  Intervention: Prevent Infection  Recent Flowsheet Documentation  Taken 6/26/2025 1726 by Constanza Pruitt RN  Infection Prevention:   environmental surveillance performed   equipment surfaces disinfected   hand hygiene promoted  Goal: Optimal Comfort and Wellbeing  Outcome: Progressing  Intervention: Monitor Pain and Promote Comfort  Recent Flowsheet Documentation  Taken 6/26/2025 1726 by Constanza Pruitt RN  Pain Management Interventions:   pillow support provided   position adjusted  Intervention: Provide Person-Centered Care  Recent Flowsheet Documentation  Taken 6/26/2025 1726 by Constanza Pruitt RN  Trust Relationship/Rapport:   care explained   choices provided   emotional support provided   empathic listening provided   questions answered   questions encouraged   reassurance provided   thoughts/feelings acknowledged  Goal: Readiness for Transition of Care  Outcome: Progressing   Goal Outcome Evaluation:  Plan of Care Reviewed With: patient, family         Patient a/ox4, right shoulder  pain, controlling pain with medications, sling applied to right shoulder, external catheter, regular diet, assist x 2, reviewed plan of care with patient and family, call light in reach, remains high falls precautions.      Reason for admission:   patient c/o right shoulder pain d/t fall. Denies hitting head. Denies loc. States she is not taking any blood thinners.    patient does report a right shoulder replacement with Dr Ivy  Significant PMH: hypotension anxiety gerd depression htn IBS sleep apnea wears mouth guard  Significant 24 hour events: admitting for pain control, Dr Costa covering for Dr Ivy surgery likely to be preformed out patient  Significant Assessment Findings: There is an oblique comminuted periprosthetic fracture involving the proximal diaphysis of the humerus adjacent to the distal tip of the humeral component of the arthroplasty. There is lateral and superior displacement of the dominant distal diaphyseal fracture fragment. There is separation of fracture fragments by approximately 1.5 cm. The glenosphere component of the arthroplasty appears to be intact. Electronically Signed: Randy Medley MD 6/26/2025 4:02 PM EDT Workstation ID: [id number]   Social: a/ox4  Additional Info: external catheter  Mobility Plan: assist x 1

## 2025-06-26 NOTE — ED NOTES
Nursing report ED to floor  Gricel Erwin  75 y.o.  female    HPI:   Chief Complaint   Patient presents with    Fall     Patient c/o right shoulder pain d/t fall. Denies hitting head. Denies loc. States she is not taking any blood thinners.       Admitting doctor:   Romero Casey MD    Admitting diagnosis:   The primary encounter diagnosis was Fall, initial encounter. A diagnosis of Other closed displaced fracture of proximal end of right humerus, initial encounter was also pertinent to this visit.    Code status:   Current Code Status       Date Active Code Status Order ID Comments User Context       Not on file            Allergies:   Kiwi extract, Aleve [naproxen sodium], Celebrex [celecoxib], Cymbalta [duloxetine hcl], Macrobid [nitrofurantoin monohyd macro], Penicillins, and Sulfa antibiotics    Isolation:  No active isolations     Fall Risk:  Fall Risk Assessment was completed, and patient is at low risk for falls.   Predictive Model Details         10 (Low) Factor Value    Calculated 6/26/2025 16:20 Age 75    Risk of Fall Model Active Peripheral IV Present     Imaging order in this encounter Present     Respiratory Rate 18     Magnesium not on file     Number of administrations of Analgesic Narcotics 4     Number of Distinct Medication Classes administered 3     Evans Scale not on file     Number of administrations of Anti-Rheumatics 1     Diastolic BP 77     Calcium not on file     Albumin not on file     Duration of Current Encounter 0.174 days     Total Bilirubin not on file     Chloride not on file     Potassium not on file     Creatinine not on file     ALT not on file         Weight:       06/26/25  1221   Weight: 68 kg (150 lb)       Intake and Output  No intake or output data in the 24 hours ending 06/26/25 1624    Diet:        Most recent vitals:   Vitals:    06/26/25 1425 06/26/25 1430 06/26/25 1445 06/26/25 1500   BP:  132/77 155/78 148/77   BP Location:       Patient Position:       Pulse: 70 71  72 73   Resp:  18  18   Temp:       TempSrc:       SpO2: 96% 97% 96% 95%   Weight:       Height:           Active LDAs/IV Access:   Lines, Drains & Airways       Active LDAs       Name Placement date Placement time Site Days    Peripheral IV 06/26/25 1208 20 G Left Antecubital 06/26/25  1208  Antecubital  less than 1                    Skin Condition:   Skin Assessments (last day)       None             Labs (abnormal labs have a star):   Labs Reviewed - No data to display    LOC: Person, Place, Time, and Situation    Telemetry:  Observation Unit    Cardiac Monitoring Ordered: yes    EKG:   No orders to display       Medications Given in the ED:   Medications   HYDROmorphone (DILAUDID) injection 0.5 mg (0.5 mg Intravenous Given 6/26/25 1240)   HYDROmorphone (DILAUDID) injection 0.5 mg (0.5 mg Intravenous Given 6/26/25 1319)   HYDROmorphone (DILAUDID) injection 0.5 mg (0.5 mg Intravenous Given 6/26/25 1401)   morphine injection 4 mg (4 mg Intravenous Given 6/26/25 1446)   ketorolac (TORADOL) injection 30 mg (30 mg Intravenous Given 6/26/25 1516)   ketamine hcl syringe solution prefilled syringe 6.8 mg (6.8 mg Intravenous Given by Other 6/26/25 1616)       Imaging results:  CT Upper Extremity Right Without Contrast  Result Date: 6/26/2025  Impression: There is an oblique comminuted periprosthetic fracture involving the proximal diaphysis of the humerus adjacent to the distal tip of the humeral component of the arthroplasty. There is lateral and superior displacement of the dominant distal diaphyseal fracture fragment. There is separation of fracture fragments by approximately 1.5 cm. The glenosphere component of the arthroplasty appears to be intact. Electronically Signed: Randy Medley MD  6/26/2025 4:02 PM EDT  Workstation ID: [id number]    XR Shoulder 2+ View Right  Result Date: 6/26/2025  Impression: Comminuted displaced fracture of the proximal right humerus adjacent to the distal margin of the humeral stem  component of the shoulder prosthesis. No shoulder dislocation. Electronically Signed: Tamar Woody MD  6/26/2025 1:24 PM EDT  Workstation ID: [id number]      Social issues:   Social History     Socioeconomic History    Marital status:    Tobacco Use    Smoking status: Never    Smokeless tobacco: Never   Vaping Use    Vaping status: Never Used   Substance and Sexual Activity    Alcohol use: No    Drug use: No    Sexual activity: Defer       NIH Stroke Scale:  Interval: (not recorded)  1a. Level of Consciousness: (not recorded)  1b. LOC Questions: (not recorded)  1c. LOC Commands: (not recorded)  2. Best Gaze: (not recorded)  3. Visual: (not recorded)  4. Facial Palsy: (not recorded)  5a. Motor Arm, Left: (not recorded)  5b. Motor Arm, Right: (not recorded)  6a. Motor Leg, Left: (not recorded)  6b. Motor Leg, Right: (not recorded)  7. Limb Ataxia: (not recorded)  8. Sensory: (not recorded)  9. Best Language: (not recorded)  10. Dysarthria: (not recorded)  11. Extinction and Inattention (formerly Neglect): (not recorded)    Total (NIH Stroke Scale): (not recorded)     Additional notable assessment information:.     Nursing report ED to floor:  Constanza Sylvester RN   06/26/25 16:24 EDT

## 2025-06-26 NOTE — ED PROVIDER NOTES
Subjective   History of Present Illness  Is a 75-year-old white female with history of hypertension who presents today by EMS accompanied by her daughter with reports of a fall.  She tripped over a concrete step landed on her right side.  She states she did strike her head but denies loss of consciousness.  She complains of significant pain in the right shoulder and upper arm with inability to move the right arm due to pain.  She denies numbness tingling or weakness in the extremity.  She denies any other injury or complaint.  She does report a prior right shoulder replacement with Dr. Santamaria.      Review of Systems   Musculoskeletal:         Right upper arm injury/pain       Past Medical History:   Diagnosis Date    Allergies     Anxiety     Arthritis     Depression     GERD (gastroesophageal reflux disease)     History of COVID-19     History of pancreatitis     Hyperlipidemia     Hypertension     IBS (irritable bowel syndrome)     Insomnia     Left shoulder pain     Sleep apnea     WEARS MOUTH GUARD    Slow to wake up after anesthesia        Allergies   Allergen Reactions    Kiwi Extract Anaphylaxis    Aleve [Naproxen Sodium] Hives     ALL NSAIDS - IN LARGE DOSES      Celebrex [Celecoxib] Hives    Cymbalta [Duloxetine Hcl] Hives    Macrobid [Nitrofurantoin Monohyd Macro] Hives    Penicillins Hives    Sulfa Antibiotics Hives       Past Surgical History:   Procedure Laterality Date    ABDOMINOPLASTY  1980     SECTION       AND     CHOLECYSTECTOMY      CO2 LASER OF THE FACE  2014    LASER ON FACE FOR PRE-CANCER SPOTS     ELBOW PROCEDURE Left      AND  BROKEN  ELBOW AND SURGERY    FACIAL COSMETIC SURGERY  2010    NECK AND FACE LIFT     HYSTERECTOMY  1986    LIPOSUCTION  2009    WITH DEONDRE JI     REVISION OF SCAR  1988    THROAT SURGERY      LASER THROAT SURGERY    TOTAL KNEE ARTHROPLASTY Bilateral 2013    TOTAL SHOULDER ARTHROPLASTY W/ DISTAL CLAVICLE EXCISION Left 2025     Procedure: LEFT TOTAL SHOULDER REVERSE ARTHROPLASTY;  Surgeon: Barber Santamaria MD;  Location: [facility];  Service: Orthopedics;  Laterality: Left;    TOTAL SHOULDER REVERSE ARTHROPLASTY Right     TUBAL ABDOMINAL LIGATION  1981       Family History   Problem Relation Age of Onset    Hypertension Mother     Depression Father     Diabetes Father     Heart attack Father     Hyperlipidemia Father     Hypertension Father     Depression Sister     Heart attack Sister     Hypertension Sister     Cancer Sister         UNCERTAIN SITE-POSSIBLE SALIVARY GLAND     Depression Brother     Hypertension Brother     Stomach cancer Maternal Grandmother     Prostate cancer Maternal Grandfather     Ovarian cancer Cousin 25    Malig Hyperthermia Neg Hx        Social History     Socioeconomic History    Marital status:    Tobacco Use    Smoking status: Never    Smokeless tobacco: Never   Vaping Use    Vaping status: Never Used   Substance and Sexual Activity    Alcohol use: No    Drug use: No    Sexual activity: Defer           Objective   Physical Exam  Vital signs and triage nurse note reviewed.  Constitutional: Awake, alert; well-developed and well-nourished. No acute distress is noted.  Appears uncomfortable due to pain.  HEENT: Normocephalic, atraumatic; pupils are PERRL with intact EOM; oropharynx is pink and moist without exudate or erythema.  No drooling or pooling of oral secretions.  No visible sign of trauma to the face or scalp.  No Wilkerson sign noted.  No raccoon eyes.  No hemotympanum.  Neck: Supple, full range of motion without pain.  No midline tenderness crepitus or step-off noted.  Cardiovascular: Regular rate and rhythm, normal S1-S2.  No murmur noted.  Pulmonary: Respiratory effort regular nonlabored, breath sounds clear to auscultation all fields.  Abdomen: Soft, nontender, nondistended with normoactive bowel sounds; no rebound or guarding.  Musculoskeletal: Independent range of motion of all  extremities.  Decreased range of motion of the right arm due to pain.  There is deformity noted at the right proximal humerus and shoulder.  No open wounds.  There is no overlying erythema or ecchymosis.  No crepitus.  There is good cap refill and sensation distally.  Good range of motion at the elbow wrist and fingers.  There is a strong radial pulse.  Skin: Flesh tone, warm, dry, intact; no erythematous or petechial rash or lesion.    Procedures           ED Course  ED Course as of 06/26/25 1629   Thu Jun 26, 2025   1403 Awaiting call back from ortho [MD]   1524 Spoke with Dr. Santamaria's PA, Britta who forwarded xray images to him for review.  Subsequently sent to Dr. Costa who is covering here at [facility].  Plan is for outpatient surgical repair by Dr. Santamaria.  Can admit to obs here for pain control if needed but surgery will likely not be performed during admission.  Dr. Costa recommends giving Toradol 30mg and obtaining CT scan.      Plan discussed with patient and daughter at bedside. [MD]   1600 Patient and daughter at bedside do not feel patient can go home due to continued significant pain.  Will place in obs for continued pain management.  [MD]      ED Course User Index  [MD] Katie Aguilar APRN      Labs Reviewed - No data to display  CT Upper Extremity Right Without Contrast  Result Date: 6/26/2025  Impression: There is an oblique comminuted periprosthetic fracture involving the proximal diaphysis of the humerus adjacent to the distal tip of the humeral component of the arthroplasty. There is lateral and superior displacement of the dominant distal diaphyseal fracture fragment. There is separation of fracture fragments by approximately 1.5 cm. The glenosphere component of the arthroplasty appears to be intact. Electronically Signed: Randy Medley MD  6/26/2025 4:02 PM EDT  Workstation ID: [id number]    XR Shoulder 2+ View Right  Result Date: 6/26/2025  Impression: Comminuted displaced fracture of the  proximal right humerus adjacent to the distal margin of the humeral stem component of the shoulder prosthesis. No shoulder dislocation. Electronically Signed: Tamar Woody MD  6/26/2025 1:24 PM EDT  Workstation ID: [id number]    Medications   HYDROmorphone (DILAUDID) injection 0.5 mg (0.5 mg Intravenous Given 6/26/25 1240)   HYDROmorphone (DILAUDID) injection 0.5 mg (0.5 mg Intravenous Given 6/26/25 1319)   HYDROmorphone (DILAUDID) injection 0.5 mg (0.5 mg Intravenous Given 6/26/25 1401)   morphine injection 4 mg (4 mg Intravenous Given 6/26/25 1446)   ketorolac (TORADOL) injection 30 mg (30 mg Intravenous Given 6/26/25 1516)   ketamine hcl syringe solution prefilled syringe 6.8 mg (6.8 mg Intravenous Given by Other 6/26/25 1616)                                                      Medical Decision Making  Patient presents today with the above complaint.    She had above exam and evaluation.  X-rays were obtained.  Patient was given Dilaudid for pain.  She had fentanyl and route by EMS.    Workup: X-ray of the right shoulder was independently interpreted by Dr. Casey and reviewed by myself and shows a fracture of the proximal humerus.  Radiologist reports oblique comminuted periprosthetic fracture involving the proximal diaphysis of the humerus adjacent to the distal tip of the humeral component of the arthroplasty.  There is lateral and superior displacement of the dominant distal diaphyseal fracture fragment.  There is separation of fracture fragments by approximately 1.5 cm.    Patient required multiple doses of narcotic pain medication and despite these measures has continued pain.  Case discussed with orthopedics as above.  Patient was given a dose of Toradol with no relief in her pain.    Findings and plan discussed with the patient he will be placed in observation unit for pain control.  She was given a dose of ketamine with improvement in her pain here in the ED and sling was then applied.  Case discussed  the observation unit PA.    Problems Addressed:  Fall, initial encounter: complicated acute illness or injury  Other closed displaced fracture of proximal end of right humerus, initial encounter: complicated acute illness or injury    Amount and/or Complexity of Data Reviewed  Radiology: ordered.    Risk  Prescription drug management.  Decision regarding hospitalization.        Final diagnoses:   Fall, initial encounter   Other closed displaced fracture of proximal end of right humerus, initial encounter       ED Disposition  ED Disposition       ED Disposition   Decision to Admit    Condition   --    Comment   --               No follow-up provider specified.       Medication List      No changes were made to your prescriptions during this visit.            Katie Aguilar, APRN  06/26/25 [id number]

## 2025-06-27 VITALS
RESPIRATION RATE: 13 BRPM | OXYGEN SATURATION: 94 % | BODY MASS INDEX: 26.56 KG/M2 | TEMPERATURE: 98.7 F | HEART RATE: 79 BPM | DIASTOLIC BLOOD PRESSURE: 76 MMHG | HEIGHT: 63 IN | WEIGHT: 149.91 LBS | SYSTOLIC BLOOD PRESSURE: 118 MMHG

## 2025-06-27 LAB
ANION GAP SERPL CALCULATED.3IONS-SCNC: 10 MMOL/L (ref 5–15)
BASOPHILS # BLD AUTO: 0.03 10*3/MM3 (ref 0–0.2)
BASOPHILS NFR BLD AUTO: 0.3 % (ref 0–1.5)
BUN SERPL-MCNC: 25.3 MG/DL (ref 8–23)
BUN/CREAT SERPL: 18.2 (ref 7–25)
CALCIUM SPEC-SCNC: 8.7 MG/DL (ref 8.6–10.5)
CHLORIDE SERPL-SCNC: 100 MMOL/L (ref 98–107)
CO2 SERPL-SCNC: 24 MMOL/L (ref 22–29)
CREAT SERPL-MCNC: 1.39 MG/DL (ref 0.57–1)
DEPRECATED RDW RBC AUTO: 49.1 FL (ref 37–54)
EGFRCR SERPLBLD CKD-EPI 2021: 39.9 ML/MIN/1.73
EOSINOPHIL # BLD AUTO: 0.08 10*3/MM3 (ref 0–0.4)
EOSINOPHIL NFR BLD AUTO: 0.7 % (ref 0.3–6.2)
ERYTHROCYTE [DISTWIDTH] IN BLOOD BY AUTOMATED COUNT: 15.7 % (ref 12.3–15.4)
GLUCOSE SERPL-MCNC: 104 MG/DL (ref 65–99)
HCT VFR BLD AUTO: 34.5 % (ref 34–46.6)
HGB BLD-MCNC: 10 G/DL (ref 12–15.9)
IMM GRANULOCYTES # BLD AUTO: 0.03 10*3/MM3 (ref 0–0.05)
IMM GRANULOCYTES NFR BLD AUTO: 0.3 % (ref 0–0.5)
LYMPHOCYTES # BLD AUTO: 1.42 10*3/MM3 (ref 0.7–3.1)
LYMPHOCYTES NFR BLD AUTO: 13.1 % (ref 19.6–45.3)
MAGNESIUM SERPL-MCNC: 2.3 MG/DL (ref 1.6–2.4)
MCH RBC QN AUTO: 24.8 PG (ref 26.6–33)
MCHC RBC AUTO-ENTMCNC: 29 G/DL (ref 31.5–35.7)
MCV RBC AUTO: 85.6 FL (ref 79–97)
MONOCYTES # BLD AUTO: 1.09 10*3/MM3 (ref 0.1–0.9)
MONOCYTES NFR BLD AUTO: 10.1 % (ref 5–12)
NEUTROPHILS NFR BLD AUTO: 75.5 % (ref 42.7–76)
NEUTROPHILS NFR BLD AUTO: 8.15 10*3/MM3 (ref 1.7–7)
NRBC BLD AUTO-RTO: 0 /100 WBC (ref 0–0.2)
PLATELET # BLD AUTO: 381 10*3/MM3 (ref 140–450)
PMV BLD AUTO: 9.6 FL (ref 6–12)
POTASSIUM SERPL-SCNC: 4.6 MMOL/L (ref 3.5–5.2)
RBC # BLD AUTO: 4.03 10*6/MM3 (ref 3.77–5.28)
SODIUM SERPL-SCNC: 134 MMOL/L (ref 136–145)
WBC NRBC COR # BLD AUTO: 10.8 10*3/MM3 (ref 3.4–10.8)

## 2025-06-27 PROCEDURE — G0378 HOSPITAL OBSERVATION PER HR: HCPCS

## 2025-06-27 PROCEDURE — 83735 ASSAY OF MAGNESIUM: CPT | Performed by: PHYSICIAN ASSISTANT

## 2025-06-27 PROCEDURE — 96376 TX/PRO/DX INJ SAME DRUG ADON: CPT

## 2025-06-27 PROCEDURE — 80048 BASIC METABOLIC PNL TOTAL CA: CPT | Performed by: PHYSICIAN ASSISTANT

## 2025-06-27 PROCEDURE — 97162 PT EVAL MOD COMPLEX 30 MIN: CPT | Performed by: PHYSICAL THERAPIST

## 2025-06-27 PROCEDURE — 85025 COMPLETE CBC W/AUTO DIFF WBC: CPT | Performed by: PHYSICIAN ASSISTANT

## 2025-06-27 PROCEDURE — 25010000002 MORPHINE PER 10 MG: Performed by: PHYSICIAN ASSISTANT

## 2025-06-27 RX ORDER — MONTELUKAST SODIUM 10 MG/1
10 TABLET ORAL NIGHTLY
Status: DISCONTINUED | OUTPATIENT
Start: 2025-06-27 | End: 2025-06-27 | Stop reason: HOSPADM

## 2025-06-27 RX ORDER — ESTRADIOL 1 MG/1
0.5 TABLET ORAL DAILY
Status: DISCONTINUED | OUTPATIENT
Start: 2025-06-27 | End: 2025-06-27 | Stop reason: HOSPADM

## 2025-06-27 RX ORDER — TRAZODONE HYDROCHLORIDE 50 MG/1
50 TABLET ORAL NIGHTLY
Status: DISCONTINUED | OUTPATIENT
Start: 2025-06-27 | End: 2025-06-27 | Stop reason: HOSPADM

## 2025-06-27 RX ORDER — PANTOPRAZOLE SODIUM 40 MG/1
40 TABLET, DELAYED RELEASE ORAL
Status: DISCONTINUED | OUTPATIENT
Start: 2025-06-27 | End: 2025-06-27 | Stop reason: HOSPADM

## 2025-06-27 RX ORDER — CETIRIZINE HYDROCHLORIDE 10 MG/1
10 TABLET ORAL DAILY
Status: DISCONTINUED | OUTPATIENT
Start: 2025-06-27 | End: 2025-06-27 | Stop reason: HOSPADM

## 2025-06-27 RX ORDER — DICYCLOMINE HYDROCHLORIDE 10 MG/1
10 CAPSULE ORAL
Status: DISCONTINUED | OUTPATIENT
Start: 2025-06-27 | End: 2025-06-27 | Stop reason: HOSPADM

## 2025-06-27 RX ORDER — BUPROPION HYDROCHLORIDE 150 MG/1
300 TABLET ORAL NIGHTLY
Status: DISCONTINUED | OUTPATIENT
Start: 2025-06-27 | End: 2025-06-27 | Stop reason: HOSPADM

## 2025-06-27 RX ORDER — OXYCODONE HYDROCHLORIDE 5 MG/1
5 TABLET ORAL ONCE
Refills: 0 | Status: COMPLETED | OUTPATIENT
Start: 2025-06-27 | End: 2025-06-27

## 2025-06-27 RX ORDER — AMLODIPINE BESYLATE 5 MG/1
5 TABLET ORAL 2 TIMES DAILY
Status: DISCONTINUED | OUTPATIENT
Start: 2025-06-27 | End: 2025-06-27 | Stop reason: HOSPADM

## 2025-06-27 RX ORDER — OXYCODONE AND ACETAMINOPHEN 5; 325 MG/1; MG/1
.5-1 TABLET ORAL EVERY 6 HOURS PRN
Qty: 28 TABLET | Refills: 0 | Status: ON HOLD | OUTPATIENT
Start: 2025-06-27 | End: 2025-07-03 | Stop reason: SDUPTHER

## 2025-06-27 RX ADMIN — MORPHINE SULFATE 4 MG: 4 INJECTION, SOLUTION INTRAMUSCULAR; INTRAVENOUS at 05:51

## 2025-06-27 RX ADMIN — MORPHINE SULFATE 4 MG: 4 INJECTION, SOLUTION INTRAMUSCULAR; INTRAVENOUS at 01:59

## 2025-06-27 RX ADMIN — OXYCODONE 5 MG: 5 TABLET ORAL at 09:20

## 2025-06-27 NOTE — THERAPY EVALUATION
Patient Name: Gricel Erwin  : 1950    MRN: [id number]                              Today's Date: 2025       Admit Date: 2025    Visit Dx:     ICD-10-CM ICD-9-CM   1. Fall, initial encounter  W19.XXXA E888.9   2. Other closed displaced fracture of proximal end of right humerus, initial encounter  S42.291A 812.09   3. Closed fracture of proximal end of right humerus, unspecified fracture morphology, initial encounter  S42.201A 812.00     Patient Active Problem List   Diagnosis    Lump or mass in breast    Abnormal mammogram    Abnormal ultrasound of breast    FH: ovarian cancer    Diffuse cystic mastopathy    Humerus fracture     Past Medical History:   Diagnosis Date    Allergies     Anxiety     Arthritis     Depression     GERD (gastroesophageal reflux disease)     History of COVID-19     History of pancreatitis     Hyperlipidemia     Hypertension     IBS (irritable bowel syndrome)     Insomnia     Left shoulder pain     Sleep apnea     WEARS MOUTH GUARD    Slow to wake up after anesthesia      Past Surgical History:   Procedure Laterality Date    ABDOMINOPLASTY       SECTION       AND     CHOLECYSTECTOMY      CO2 LASER OF THE FACE  2014    LASER ON FACE FOR PRE-CANCER SPOTS     ELBOW PROCEDURE Left      AND  BROKEN  ELBOW AND SURGERY    FACIAL COSMETIC SURGERY  2010    NECK AND FACE LIFT     HYSTERECTOMY  1986    LIPOSUCTION  2009    WITH TUMMY TUCK     REVISION OF SCAR  1988    THROAT SURGERY      LASER THROAT SURGERY    TOTAL KNEE ARTHROPLASTY Bilateral 2013    TOTAL SHOULDER ARTHROPLASTY W/ DISTAL CLAVICLE EXCISION Left 2025    Procedure: LEFT TOTAL SHOULDER REVERSE ARTHROPLASTY;  Surgeon: Barber Santamaria MD;  Location: [facility];  Service: Orthopedics;  Laterality: Left;    TOTAL SHOULDER REVERSE ARTHROPLASTY Right     TUBAL ABDOMINAL LIGATION        General Information       Row Name 25 1442          Physical Therapy Time and  Intention    Document Type evaluation  -EJ     Mode of Treatment physical therapy  -       Row Name 06/27/25 1442          General Information    Patient Profile Reviewed yes  -EJ     Prior Level of Function independent:  -EJ     Existing Precautions/Restrictions brace on at all times  R shoulder comminuted displaced humerus fx - sling to be worn at all times. NWB RUE.  No ROM.  -EJ     Barriers to Rehab none identified  -[facility] Name 06/27/25 1442          Living Environment    Current Living Arrangements home  -EJ     People in Home spouse  -EJ     Name(s) of People in Home Biju (spouse)  -[facility] Name 06/27/25 1442          Home Main Entrance    Number of Stairs, Main Entrance two  -EJ     Stair Railings, Main Entrance railings safe and in good condition  -[facility] Name 06/27/25 1442          Stairs Within Home, Primary    Number of Stairs, Within Home, Primary none  -[facility] Name 06/27/25 1442          Cognition    Orientation Status (Cognition) oriented x 4  -[facility] Name 06/27/25 1442          Safety Issues/Impairments Affecting Functional Mobility    Impairments Affecting Function (Mobility) balance;endurance/activity tolerance;grasp;pain;postural/trunk control;range of motion (ROM);strength  -EJ               User Key  (r) = Recorded By, (t) = Taken By, (c) = Cosigned By      Initials Name Provider Type     Noemi Fairchild, PT Physical Therapist                   Mobility       [facility] Name 06/27/25 1445          Bed Mobility    Bed Mobility bed mobility (all) activities  -     All Activities, [place] (Bed Mobility) moderate assist (50% patient effort);minimum assist (75% patient effort)  -     Assistive Device (Bed Mobility) bed rails;head of bed elevated  -[facility] Name 06/27/25 1445          Sit-Stand Transfer    Sit-Stand [place] (Transfers) minimum assist (75% patient effort)  -     Assistive Device (Sit-Stand Transfers) other (see comments)  OSIRIS ELLIS  -EJ        [facility] Name 06/27/25 1445          Gait/Stairs (Locomotion)    [place] Level (Gait) minimum assist (75% patient effort)  -EJ     Assistive Device (Gait) walker, front-wheeled  -EJ     Patient was able to Ambulate yes  -EJ     Distance in Feet (Gait) 65  -EJ     Deviations/Abnormal Patterns (Gait) gait speed decreased;stride length decreased;leola decreased  -[facility] Name 06/27/25 1445          Mobility    Extremity Weight-bearing Status right upper extremity  -EJ     Right Upper Extremity (Weight-bearing Status) non weight-bearing (NWB)  -EJ               User Key  (r) = Recorded By, (t) = Taken By, (c) = Cosigned By      Initials Name Provider Type     Noemi Fairchild, PT Physical Therapist                   Obj/Interventions       [facility] Name 06/27/25 1505          Range of Motion Comprehensive    Comment, General Range of Motion Slight reduction in BLE AROM due to guarded position to protect shoulder/having pain.  Patient CALEB WFL.  RUE NT due to fx and in sling.  -[facility] Name 06/27/25 1505          Strength Comprehensive (MMT)    Comment, General Manual Muscle Testing (MMT) Assessment BLE strength functionally 3+/5.  UEs NT this date, but pt able to pull and  well with LUE.  -[facility] Name 06/27/25 1505          Motor Skills    Motor Skills functional endurance  -EJ     Functional Endurance Fair-  -[facility] Name 06/27/25 1505          Balance    Balance Assessment sitting static balance;sitting dynamic balance;standing static balance;standing dynamic balance  -EJ     Static Sitting Balance minimal assist;contact guard  -EJ     Dynamic Sitting Balance minimal assist  -EJ     Position, Sitting Balance sitting edge of bed  -EJ     Static Standing Balance contact guard;minimal assist  -EJ     Dynamic Standing Balance minimal assist  -EJ     Position/Device Used, Standing Balance supported;other (see comments)  HHA CALEB.  -[facility] Name 06/27/25 1505          Sensory Assessment  (Somatosensory)    Sensory Assessment (Somatosensory) sensation intact  -EJ               User Key  (r) = Recorded By, (t) = Taken By, (c) = Cosigned By      Initials Name Provider Type    Noemi Garnett PT Physical Therapist                   Goals/Plan    No documentation.                  Clinical Impression       Row Name 06/27/25 1508          Pain    Pretreatment Pain Rating 6/10  -EJ     Posttreatment Pain Rating 6/10  -EJ     Pain Location shoulder  -EJ     Pain Side/Orientation right  -EJ     Pain Management Interventions activity modification encouraged;positioning techniques utilized  -EJ     Response to Pain Interventions activity participation with tolerable pain  -EJ       Row Name 06/27/25 1508          Plan of Care Review    Plan of Care Reviewed With patient;spouse  -EJ     Outcome Evaluation Patient is a 74 y/o F who was admitted to [facility] on 6/26/25 due to a fall resulting in pt landing on her R side.  Pt reported landing on her R shoulder/hip/and slightly hit the R side of her head. No LOC reported.  Imaging was performed showing a comminuted and displaced fracture of pt's R proximal humerus.  Surgery is planned on an outpatient basis by Dr. Santamaria on Wednesday this week.  Pt has a h/o B TSA and B TKA also.  She recently finished therapy for her L TSA.  At baseline, pt is independent with all mobility and does not use an AD.  Pt still drives.  During today's evaluatoin pt was able to perform bed mobility with min to mod A supine to sit, min A sit to stand with HHA LUE, and she was able to ambulate with min A with LUE HHA for 75 feet.  Pt was unsteady on her feet and required mod A on 2 different occasions due to slight LOB.  Pt with narrow CEASAR and took very small steps.  She was very guarded in movement due to wanting to protect her R shoulder.  Upon entering pt's room, sling was in place on pt's R UE.  At this time, PT recommends pt d/c home with spouse.  Pt is to have surgery Wednesday, and  when pt is medically appropriate, she plans to resume OPPT with her previous PT.  PT will sign off/complete orders at this time as d/c orders are in place.  -       Row Name 06/27/25 1508          Therapy Assessment/Plan (PT)    Criteria for Skilled Interventions Met (PT) yes;meets criteria  Pt meets criteria for therapy, but is to d/c today.  -EJ     Therapy Frequency (PT) evaluation only  -EJ     Predicted Duration of Therapy Intervention (PT) discharge  -       Row Name 06/27/25 1508          Vital Signs    Recovery Time VSS  -[facility] Name 06/27/25 1508          Positioning and Restraints    Pre-Treatment Position in bed  -EJ     Post Treatment Position chair  -EJ     In Chair reclined;call light within reach;encouraged to call for assist;exit alarm on;with family/caregiver  -               User Key  (r) = Recorded By, (t) = Taken By, (c) = Cosigned By      Initials Name Provider Type     Noemi Fairchild, PT Physical Therapist                   Outcome Measures       Row Name 06/27/25 1517 06/27/25 0800       How much help from another person do you currently need...    Turning from your back to your side while in flat bed without using bedrails? 3  -EJ 3  -VM    Moving from lying on back to sitting on the side of a flat bed without bedrails? 2  -EJ 3  -VM    Moving to and from a bed to a chair (including a wheelchair)? 3  -EJ 3  -VM    Standing up from a chair using your arms (e.g., wheelchair, bedside chair)? 3  -EJ 3  -VM    Climbing 3-5 steps with a railing? 2  -EJ 3  -VM    To walk in hospital room? 3  -EJ 3  -VM    AM-PAC 6 Clicks Score (PT) 16  -EJ 18  -VM    Highest Level of Mobility Goal Stand (1 or More Minutes)-5  -EJ Walk 10 Steps or More-6  -VM      Row Name 06/27/25 1517          Functional Assessment    Outcome Measure Options AM-PAC 6 Clicks Basic Mobility (PT)  -               User Key  (r) = Recorded By, (t) = Taken By, (c) = Cosigned By      Initials Name Provider Type    EJ  Noemi Fairchild, PT Physical Therapist    Sanjiv Davalos RN Registered Nurse                                 Physical Therapy Education       Title: PT OT SLP Therapies (In Progress)       Topic: Physical Therapy (Done)       Point: Mobility training (Done)       Learning Progress Summary            Patient Acceptance, E, VU by  at 6/27/2025 1518   Family Acceptance, E, VU by EJ at 6/27/2025 1518                      Point: Body mechanics (Done)       Learning Progress Summary            Patient Acceptance, E, VU by EJ at 6/27/2025 1518   Family Acceptance, E, VU by EJ at 6/27/2025 1518                      Point: Precautions (Done)       Learning Progress Summary            Patient Acceptance, E, VU by EJ at 6/27/2025 1518   Family Acceptance, E, VU by EJ at 6/27/2025 1518                                      User Key       Initials Effective Dates Name Provider Type Discipline     06/03/24 -  Noemi Fairchild, PT Physical Therapist PT                  PT Recommendation and Plan     Outcome Evaluation: Patient is a 76 y/o F who was admitted to [facility] on 6/26/25 due to a fall resulting in pt landing on her R side.  Pt reported landing on her R shoulder/hip/and slightly hit the R side of her head. No LOC reported.  Imaging was performed showing a comminuted and displaced fracture of pt's R proximal humerus.  Surgery is planned on an outpatient basis by Dr. Santamaria on Wednesday this week.  Pt has a h/o B TSA and B TKA also.  She recently finished therapy for her L TSA.  At baseline, pt is independent with all mobility and does not use an AD.  Pt still drives.  During today's evaluatoin pt was able to perform bed mobility with min to mod A supine to sit, min A sit to stand with HHA LUE, and she was able to ambulate with min A with LUE HHA for 75 feet.  Pt was unsteady on her feet and required mod A on 2 different occasions due to slight LOB.  Pt with narrow CEASAR and took very small steps.  She was very  guarded in movement due to wanting to protect her R shoulder.  Upon entering pt's room, sling was in place on pt's R UE.  At this time, PT recommends pt d/c home with spouse.  Pt is to have surgery Wednesday, and when pt is medically appropriate, she plans to resume OPPT with her previous PT.  PT will sign off/complete orders at this time as d/c orders are in place.     Time Calculation:         PT Charges       Row Name 06/27/25 1518             Time Calculation    Start Time 0848  -EJ      Stop Time 0917  -EJ      Time Calculation (min) 29 min  -EJ      PT Received On 06/27/25  -EJ         Time Calculation- PT    Total Timed Code Minutes- PT 0 minute(s)  -EJ                User Key  (r) = Recorded By, (t) = Taken By, (c) = Cosigned By      Initials Name Provider Type    Noemi Garnett, PT Physical Therapist                  Therapy Charges for Today       Code Description Service Date Service Provider Modifiers Qty    40270846110 HC PT EVAL MOD COMPLEXITY 4 6/27/2025 Noemi Fairchild, PT GP 1            PT G-Codes  Outcome Measure Options: AM-PAC 6 Clicks Basic Mobility (PT)  AM-PAC 6 Clicks Score (PT): 16  PT Discharge Summary  Anticipated Discharge Disposition (PT): home with assist    Noemi Fairchild, PT  6/27/2025

## 2025-06-27 NOTE — DISCHARGE SUMMARY
[facility]    Riana Ivy MD    CHIEF COMPLAINT:     Humerus fracture    HISTORY OF PRESENT ILLNESS:    HPI    25: er note- Is a 75-year-old white female with history of hypertension who presents today by EMS accompanied by her daughter with reports of a fall.  She tripped over a concrete step landed on her right side.  She states she did strike her head but denies loss of consciousness.  She complains of significant pain in the right shoulder and upper arm with inability to move the right arm due to pain.  She denies numbness tingling or weakness in the extremity.  She denies any other injury or complaint.  She does report a prior right shoulder replacement with Dr. Santamaria.    25: observation note  - Patient endorses above history.  Spouse at bedside who is also physician.  She was seen by orthopedics and advised for outpatient surgery next week.  She is already in a sling and swath.  She is opioid naïve and inspect was reviewed and she will be discharged home with Percocet.  They politely declined any need for DME.      Past Medical History:   Diagnosis Date    Allergies     Anxiety     Arthritis     Depression     GERD (gastroesophageal reflux disease)     History of COVID-19     History of pancreatitis     Hyperlipidemia     Hypertension     IBS (irritable bowel syndrome)     Insomnia     Left shoulder pain     Sleep apnea     WEARS MOUTH GUARD    Slow to wake up after anesthesia      Past Surgical History:   Procedure Laterality Date    ABDOMINOPLASTY       SECTION       AND     CHOLECYSTECTOMY      CO2 LASER OF THE FACE  2014    LASER ON FACE FOR PRE-CANCER SPOTS     ELBOW PROCEDURE Left      AND  BROKEN  ELBOW AND SURGERY    FACIAL COSMETIC SURGERY      NECK AND FACE LIFT     HYSTERECTOMY  1986    LIPOSUCTION      WITH DEONDRE JI     REVISION OF SCAR  1988    THROAT SURGERY      LASER THROAT SURGERY    TOTAL KNEE  ARTHROPLASTY Bilateral 02/08/2013    TOTAL SHOULDER ARTHROPLASTY W/ DISTAL CLAVICLE EXCISION Left 1/23/2025    Procedure: LEFT TOTAL SHOULDER REVERSE ARTHROPLASTY;  Surgeon: Barber Santamaria MD;  [place] [facility] [place] [facility] [place]  Service: Orthopedics;  Laterality: Left;    TOTAL SHOULDER REVERSE ARTHROPLASTY Right     TUBAL ABDOMINAL LIGATION  1981     Family History   Problem Relation Age of Onset    Hypertension Mother     Depression Father     Diabetes Father     Heart attack Father     Hyperlipidemia Father     Hypertension Father     Depression Sister     Heart attack Sister     Hypertension Sister     Cancer Sister         UNCERTAIN SITE-POSSIBLE SALIVARY GLAND     Depression Brother     Hypertension Brother     Stomach cancer Maternal Grandmother     Prostate cancer Maternal Grandfather     Ovarian cancer Cousin 25    Malig Hyperthermia Neg Hx      Social History     Tobacco Use    Smoking status: Never    Smokeless tobacco: Never   Vaping Use    Vaping status: Never Used   Substance Use Topics    Alcohol use: No    Drug use: No     No medications prior to admission.     Allergies:  Kiwi extract, Aleve [naproxen sodium], Celebrex [celecoxib], Cymbalta [duloxetine hcl], Macrobid [nitrofurantoin monohyd macro], Penicillins, and Sulfa antibiotics    Immunization History   Administered Date(s) Administered    COVID-19 (PFIZER) Purple Cap Monovalent 02/08/2021, 03/02/2021           REVIEW OF SYSTEMS:    Review of Systems   Constitutional: Negative for fever.   HENT:  Negative for congestion.    Cardiovascular:  Negative for chest pain.   Respiratory:  Negative for shortness of breath.    Musculoskeletal:  Positive for joint pain.   Gastrointestinal:  Negative for abdominal pain.       Vital Signs  Temp:  [97.5 °F (36.4 °C)-98.7 °F (37.1 °C)] 98.7 °F (37.1 °C)  Heart Rate:  [63-79] 79  Resp:  [12-18] 13  BP: (118-159)/(72-90) 118/76          Physical Exam:  Physical Exam       Constitutional: Awake, alert; well-developed  and well-nourished.  Spouse at bedside  HEENT: Normocephalic, atraumatic;   with intact EOM; oropharynx is pink and moist without exudate or erythema.  Neck: Supple,   Cardiovascular: Regular rate and rhythm, normal S1-S2.  Pulmonary: Respiratory effort regular nonlabored, breath sounds clear to auscultation all fields.  Abdomen: Soft, nontender nondistended with normoactive bowel sounds; no rebound or guarding.  Musculoskeletal: Right shoulder is immobilized in a sling and swath, +3 radial pulse distal neurovascular and sensorimotor intact.  Neuro: Alert oriented x3, speech is clear and appropriate, GCS 15  Skin:  Fleshtone warm, dry, intact; no erythematous or petechial rash or lesion      Emotional Behavior:   Calm and cooperative   Debilities:  None  Results Review:    I reviewed the patient's new clinical results.  Lab Results (most recent)       Procedure Component Value Units Date/Time    Magnesium [[id number]]  (Normal) Collected: 06/27/25 0553    Specimen: Blood Updated: 06/27/25 0702     Magnesium 2.3 mg/dL     Basic Metabolic Panel [[id number]]  (Abnormal) Collected: 06/27/25 0553    Specimen: Blood Updated: 06/27/25 0702     Glucose 104 mg/dL      BUN 25.3 mg/dL      Creatinine 1.39 mg/dL      Sodium 134 mmol/L      Potassium 4.6 mmol/L      Comment: Specimen hemolyzed.  Result may be falsely elevated.        Chloride 100 mmol/L      CO2 24.0 mmol/L      Calcium 8.7 mg/dL      BUN/Creatinine Ratio 18.2     Anion Gap 10.0 mmol/L      eGFR 39.9 mL/min/1.73     Narrative:      GFR Categories in Chronic Kidney Disease (CKD)              GFR Category          GFR (mL/min/1.73)    Interpretation  G1                    90 or greater        Normal or high (1)  G2                    60-89                Mild decrease (1)  G3a                   45-59                Mild to moderate decrease  G3b                   30-44                Moderate to severe decrease  G4                    15-29                Severe  decrease  G5                    14 or less           Kidney failure    (1)In the absence of evidence of kidney disease, neither GFR category G1 or G2 fulfill the criteria for CKD.    eGFR calculation 2021 CKD-EPI creatinine equation, which does not include race as a factor    CBC & Differential [[id number]]  (Abnormal) Collected: 06/27/25 0553    Specimen: Blood Updated: 06/27/25 0628    Narrative:      The following orders were created for panel order CBC & Differential.  Procedure                               Abnormality         Status                     ---------                               -----------         ------                     CBC Auto Differential[[id number]]        Abnormal            Final result                 Please view results for these tests on the individual orders.    CBC Auto Differential [[id number]]  (Abnormal) Collected: 06/27/25 0553    Specimen: Blood Updated: 06/27/25 0628     WBC 10.80 10*3/mm3      RBC 4.03 10*6/mm3      Hemoglobin 10.0 g/dL      Hematocrit 34.5 %      MCV 85.6 fL      MCH 24.8 pg      MCHC 29.0 g/dL      RDW 15.7 %      RDW-SD 49.1 fl      MPV 9.6 fL      Platelets 381 10*3/mm3      Neutrophil % 75.5 %      Lymphocyte % 13.1 %      Monocyte % 10.1 %      Eosinophil % 0.7 %      Basophil % 0.3 %      Immature Grans % 0.3 %      Neutrophils, Absolute 8.15 10*3/mm3      Lymphocytes, Absolute 1.42 10*3/mm3      Monocytes, Absolute 1.09 10*3/mm3      Eosinophils, Absolute 0.08 10*3/mm3      Basophils, Absolute 0.03 10*3/mm3      Immature Grans, Absolute 0.03 10*3/mm3      nRBC 0.0 /100 WBC             Imaging Results (Most Recent)       Procedure Component Value Units Date/Time    CT Upper Extremity Right Without Contrast [[id number]] Collected: 06/26/25 1558     Updated: 06/26/25 1604    Narrative:      CT UPPER EXTREMITY RIGHT WO CONTRAST    Date of Exam: 6/26/2025 3:56 PM EDT    Indication: abnormal xray--order per ortho request.    Comparison: None  available.    Technique: Axial CT images were obtained of the right upper extremity without contrast administration.  Sagittal and coronal reconstructions were performed.  Automated exposure control and iterative reconstruction methods were used.      Findings:  Postoperative change are noted status post prior reverse total shoulder arthroplasty.    There is an oblique comminuted periprosthetic fracture involving the proximal diaphysis of the humerus adjacent to the distal tip of the humeral component of the arthroplasty. There is lateral and superior displacement of the distal dominant diaphyseal   fracture fragment. There there is separation of fracture fragments by approximately 1.5 cm. The glenosphere component of the arthroplasty appears to be intact. There is no evidence for fracture involving the glenoid or scapula. The arthroplasty   demonstrates near-anatomic alignment without dislocation.    There is edema and ecchymosis in the soft tissues surrounding the fracture. No large hematoma is seen. No abnormal fluid collections are seen.    The acromioclavicular joint remains intact. Age-related changes are noted. The adjacent right lung is clear.      Impression:      Impression:  There is an oblique comminuted periprosthetic fracture involving the proximal diaphysis of the humerus adjacent to the distal tip of the humeral component of the arthroplasty. There is lateral and superior displacement of the dominant distal diaphyseal   fracture fragment. There is separation of fracture fragments by approximately 1.5 cm. The glenosphere component of the arthroplasty appears to be intact.        Electronically Signed: Randy Medley MD    6/26/2025 4:02 PM EDT    Workstation ID: [id number]    XR Shoulder 2+ View Right [[id number]] Collected: 06/26/25 1322     Updated: 06/26/25 1326    Narrative:      XR SHOULDER 2+ VW RIGHT    Date of Exam: 6/26/2025 1:17 PM EDT    Indication: trauma    Comparison: Right shoulder  radiographs 1/23/2025.    Findings:  Right total reverse shoulder arthroplasty changes are present. There is a displaced comminuted fracture of the proximal right humerus near the distal margin of the humeral stem component. No glenohumeral dislocation. Mild degenerative change of the   acromioclavicular joint.      Impression:      Impression:  Comminuted displaced fracture of the proximal right humerus adjacent to the distal margin of the humeral stem component of the shoulder prosthesis. No shoulder dislocation.      Electronically Signed: Tamar Woody MD    6/26/2025 1:24 PM EDT    Workstation ID: [id number]          reviewed    ECG/EMG Results (most recent)       Procedure Component Value Units Date/Time    Telemetry Scan [[id number]] Resulted: 06/26/25 1735     Updated: 06/26/25 1749    Telemetry Scan [[id number]] Resulted: 06/27/25 0005     Updated: 06/27/25 0050    Telemetry Scan [[id number]] Resulted: 06/26/25 2020     Updated: 06/27/25 0050    Telemetry Scan [[id number]] Resulted: 06/27/25 0412     Updated: 06/27/25 0506    Telemetry Scan [[id number]] Resulted: 06/27/25 0718     Updated: 06/27/25 0736          reviewed        No results found for this or any previous visit.      Microbiology Results (last 10 days)       ** No results found for the last 240 hours. **            Assessment & Plan     Humerus fracture   - f/u with Dr. Santamaria as instructed  - inspect reviewed; dc'd with percocet  - xr: Comminuted displaced fracture of the proximal right humerus adjacent to the distal margin of the humeral stem component of the shoulder prosthesis. No shoulder dislocation  - ct: There is an oblique comminuted periprosthetic fracture involving the proximal diaphysis of the humerus adjacent to the distal tip of the humeral component of the arthroplasty. There is lateral and superior displacement of the dominant distal diaphyseal fracture fragment. There is separation of fracture fragments by approximately 1.5 cm. The  glenosphere component of the arthroplasty appears to be intac  - sling/swathe  - Neurovascularly intact    htn  - controlled 118/76, continue home meds     gerd  - cont nexium         I discussed the patients findings and my recommendations with patient and spouse.     Discharge Diagnosis:      Humerus fracture      Hospital Course  Patient is a 75 y.o. female presented with a fall after she tripped over a concrete step and was noted to have a comminuted displaced fracture of the proximal right humerus close to the humeral stem of the shoulder prosthesis without dislocation.  She was placed in observation for pain control.  She was seen by her orthopedist who recommended discharge and follow-up next week for surgical repair.  She was placed in a sling and swath and given prescription for Percocet, I discussed with patient and family at bedside.  At this time patient is felt to be in good condition for discharge with close follow-up with her PCP as well as orthopedics on an outpatient basis.  Her full testing/results and plan were discussed with patient long with concerning/alarm symptoms for which to call 911/return to the ED.  All questions were answered and she verbalizes her understanding and agreement.    Past Medical History:     Past Medical History:   Diagnosis Date    Allergies     Anxiety     Arthritis     Depression     GERD (gastroesophageal reflux disease)     History of COVID-19     History of pancreatitis     Hyperlipidemia     Hypertension     IBS (irritable bowel syndrome)     Insomnia     Left shoulder pain     Sleep apnea     WEARS MOUTH GUARD    Slow to wake up after anesthesia        Past Surgical History:     Past Surgical History:   Procedure Laterality Date    ABDOMINOPLASTY       SECTION       AND     CHOLECYSTECTOMY      CO2 LASER OF THE FACE  2014    LASER ON FACE FOR PRE-CANCER SPOTS     ELBOW PROCEDURE Left      AND  BROKEN  ELBOW AND SURGERY     FACIAL COSMETIC SURGERY  2010    NECK AND FACE LIFT     HYSTERECTOMY  1986    LIPOSUCTION  2009    WITH TUMMY TUCK     REVISION OF SCAR  1988    THROAT SURGERY      LASER THROAT SURGERY    TOTAL KNEE ARTHROPLASTY Bilateral 02/08/2013    TOTAL SHOULDER ARTHROPLASTY W/ DISTAL CLAVICLE EXCISION Left 1/23/2025    Procedure: LEFT TOTAL SHOULDER REVERSE ARTHROPLASTY;  Surgeon: Barber Santamaria MD;  [place] [facility] [place] [facility] [place]  Service: Orthopedics;  Laterality: Left;    TOTAL SHOULDER REVERSE ARTHROPLASTY Right     TUBAL ABDOMINAL LIGATION  1981       Social History:   Social History     Socioeconomic History    Marital status:    Tobacco Use    Smoking status: Never    Smokeless tobacco: Never   Vaping Use    Vaping status: Never Used   Substance and Sexual Activity    Alcohol use: No    Drug use: No    Sexual activity: Defer       Procedures Performed         Consults:   Consults       Date and Time Order Name Status Description    6/26/2025  4:00 PM Ortho (on-call MD unless specified) Completed             Condition on Discharge:     Stable    Discharge Disposition  Home or Self Care    Discharge Medications     Discharge Medications        New Medications        Instructions Start Date   oxyCODONE-acetaminophen 5-325 MG per tablet  Commonly known as: PERCOCET   0.5-1 tablets, Oral, Every 6 Hours PRN             Continue These Medications        Instructions Start Date   amLODIPine 5 MG tablet  Commonly known as: NORVASC   5 mg, 2 Times Daily      buPROPion  MG 24 hr tablet  Commonly known as: WELLBUTRIN XL   300 mg, Nightly      CALCIUM + D PO   1 tablet, Daily      coenzyme Q10 50 MG capsule capsule   50 mg, Daily      dicyclomine 10 MG capsule  Commonly known as: BENTYL   10 mg, 4 Times Daily Before Meals & Nightly      esomeprazole 40 MG capsule  Commonly known as: nexIUM   40 mg, Every Morning Before Breakfast      estradiol 0.5 MG tablet  Commonly known as: ESTRACE   0.5 mg, Daily      fexofenadine 180  MG tablet  Commonly known as: ALLEGRA   180 mg, Nightly      FLUoxetine 40 MG capsule  Commonly known as: PROzac   40 mg, Nightly      FLUoxetine 20 MG capsule  Commonly known as: PROzac   20 mg, Nightly      magnesium oxide 400 (241.3 Mg) MG tablet tablet  Commonly known as: MAGOX   400 mg, Daily      montelukast 10 MG tablet  Commonly known as: SINGULAIR   10 mg, Nightly      ondansetron 4 MG tablet  Commonly known as: Zofran   4 mg, Oral, Every 8 Hours PRN      traZODone 50 MG tablet  Commonly known as: DESYREL   50 mg, Nightly      triamterene 50 MG capsule  Commonly known as: DYRENIUM   50 mg, Nightly               Discharge Diet:     Activity at Discharge:     Follow-up Appointments  Future Appointments   Date Time Provider Department Center   6/30/2025  1:30 PM  [facility]     Additional Instructions for the Follow-ups that You Need to Schedule       Discharge Follow-up with Specified Provider: dr carrion as instructed   As directed      To: dr carrion as instructed                Test Results Pending at Discharge       Risk for Readmission (LACE) Score: 1 (6/27/2025  6:00 AM)          CECIL Foreman  06/27/25  10:13 EDT

## 2025-06-27 NOTE — CONSULTS
Orthopaedic Surgery  Consult Note  Whit Garcia, APRN  [phone]    HPI:  Patient is a 75 y.o. Not  or  female who presents with Right shoulder and arm pain.  Patient states she fell yesterday and landed on her right shoulder/arm.  Patient reports sharp and throbbing pain on the lateral aspect of the right shoulder that radiates down her arm to the wrist.   Patient states her pain is better controlled today than yesterday.  Patient has a history of a right reverse total shoulder replacement.    MEDICAL HISTORY  Past Medical History:   Diagnosis Date    Allergies     Anxiety     Arthritis     Depression     GERD (gastroesophageal reflux disease)     History of COVID-19     History of pancreatitis     Hyperlipidemia     Hypertension     IBS (irritable bowel syndrome)     Insomnia     Left shoulder pain     Sleep apnea     WEARS MOUTH GUARD    Slow to wake up after anesthesia      Past Surgical History:   Procedure Laterality Date    ABDOMINOPLASTY       SECTION       AND     CHOLECYSTECTOMY      CO2 LASER OF THE FACE  2014    LASER ON FACE FOR PRE-CANCER SPOTS     ELBOW PROCEDURE Left      AND  BROKEN  ELBOW AND SURGERY    FACIAL COSMETIC SURGERY      NECK AND FACE LIFT     HYSTERECTOMY      LIPOSUCTION      WITH TUMROSIO JCCK     REVISION OF SCAR  1988    THROAT SURGERY      LASER THROAT SURGERY    TOTAL KNEE ARTHROPLASTY Bilateral 2013    TOTAL SHOULDER ARTHROPLASTY W/ DISTAL CLAVICLE EXCISION Left 2025    Procedure: LEFT TOTAL SHOULDER REVERSE ARTHROPLASTY;  Surgeon: Barber Santamaria MD;  Location: [facility] OR [facility];  Service: Orthopedics;  Laterality: Left;    TOTAL SHOULDER REVERSE ARTHROPLASTY Right     TUBAL ABDOMINAL LIGATION       Prior to Admission medications    Medication Sig Start Date End Date Taking? Authorizing Provider   amLODIPine (NORVASC) 5 MG tablet Take 1 tablet by mouth 2 (Two) Times a Day.   Yes Provider, Historical,  MD   buPROPion XL (WELLBUTRIN XL) 300 MG 24 hr tablet Take 1 tablet by mouth Every Night.   Yes Merlyn Catalan MD   Calcium Carbonate-Vitamin D (CALCIUM + D PO) Take 1 tablet by mouth Daily.   Yes eMrlyn Catalan MD   coenzyme Q10 50 MG capsule capsule Take 1 capsule by mouth Daily.   Yes Merlyn Catalan MD   dicyclomine (BENTYL) 10 MG capsule Take 1 capsule by mouth 4 (Four) Times a Day Before Meals & at Bedtime.   Yes Merlyn Catalan MD   esomeprazole (NexIUM) 40 MG capsule Take 1 capsule by mouth Every Morning Before Breakfast.   Yes Merlyn Catalan MD   estradiol (ESTRACE) 0.5 MG tablet Take 1 tablet by mouth Daily.   Yes Merlyn Catalan MD   FLUoxetine (PROzac) 20 MG capsule Take 1 capsule by mouth Every Night. In addition to a 40mg for a total dose of 20mg nightly   Yes Merlyn Catalan MD   FLUoxetine (PROzac) 40 MG capsule Take 1 capsule by mouth Every Night. In addition to a 20mg for a total dose of 40mg nightly   Yes Merlyn Catalan MD   montelukast (SINGULAIR) 10 MG tablet Take 1 tablet by mouth Every Night.   Yes Merlyn Catalan MD   traZODone (DESYREL) 50 MG tablet Take 1 tablet by mouth Every Night.   Yes Merlyn Catalan MD   triamterene (DYRENIUM) 50 MG capsule Take 1 capsule by mouth Every Night.   Yes Merlyn Catalan MD   fexofenadine (ALLEGRA) 180 MG tablet Take 1 tablet by mouth Every Night.    ProviderMerlyn MD   magnesium oxide (MAGOX) 400 (241.3 MG) MG tablet tablet Take 1 tablet by mouth Daily.    Merlyn Catalan MD   ondansetron (Zofran) 4 MG tablet Take 1 tablet by mouth Every 8 (Eight) Hours As Needed for Nausea or Vomiting. 1/23/25   Barber Santamaria MD   oxyCODONE-acetaminophen (PERCOCET) 5-325 MG per tablet Take 0.5-1 tablets by mouth Every 6 (Six) Hours As Needed for Severe Pain for up to 7 days. 6/27/25 7/4/25  Renetta oBateng APRN     Allergies   Allergen Reactions    Kiwi Extract Anaphylaxis    Aleve  "[Naproxen Sodium] Hives     ALL NSAIDS - IN LARGE DOSES      Celebrex [Celecoxib] Hives    Cymbalta [Duloxetine Hcl] Hives    Macrobid [Nitrofurantoin Monohyd Macro] Hives    Penicillins Hives    Sulfa Antibiotics Hives     Most Recent Immunizations   Administered Date(s) Administered    COVID-19 (PFIZER) Purple Cap Monovalent 03/02/2021     Social History     Tobacco Use    Smoking status: Never    Smokeless tobacco: Never   Substance Use Topics    Alcohol use: No      Social History     Substance and Sexual Activity   Drug Use No       VITALS: /76 (BP Location: Left arm, Patient Position: Lying)   Pulse 79   Temp 98.7 °F (37.1 °C) (Oral)   Resp 13   Ht 158.8 cm (62.52\")   Wt 68 kg (149 lb 14.6 oz)   SpO2 94%   BMI 26.97 kg/m²  Body mass index is 26.97 kg/m².    PHYSICAL EXAM:   CONSTITUTIONAL: No acute distress  LUNGS: Equal chest rise, no shortness of air  CARDIOVASCULAR: palpable peripheral pulses  SKIN: no skin lesions in the area examined  LYMPH: no lymphadenopathy in the area examined  Musculoskeletal:   Right shoulder:  Range of motion: Deferred  Motor: 3 out of 5 motor strength grossly  Sensation: Denies any sensory deficits  Tenderness noted upon palpation diffusely throughout the right shoulder    RADIOLOGY REVIEW:   CT Upper Extremity Right Without Contrast  Result Date: 6/26/2025  Impression: There is an oblique comminuted periprosthetic fracture involving the proximal diaphysis of the humerus adjacent to the distal tip of the humeral component of the arthroplasty. There is lateral and superior displacement of the dominant distal diaphyseal fracture fragment. There is separation of fracture fragments by approximately 1.5 cm. The glenosphere component of the arthroplasty appears to be intact. Electronically Signed: Randy Medley MD  6/26/2025 4:02 PM EDT  Workstation ID: [id number]    XR Shoulder 2+ View Right  Result Date: 6/26/2025  Impression: Comminuted displaced fracture of the proximal " right humerus adjacent to the distal margin of the humeral stem component of the shoulder prosthesis. No shoulder dislocation. Electronically Signed: Tamar Woody MD  6/26/2025 1:24 PM EDT  Workstation ID: [id number]      LABS:   Results for the past 24 hours:   Recent Results (from the past 24 hours)   Basic Metabolic Panel    Collection Time: 06/27/25  5:53 AM    Specimen: Blood   Result Value Ref Range    Glucose 104 (H) 65 - 99 mg/dL    BUN 25.3 (H) 8.0 - 23.0 mg/dL    Creatinine 1.39 (H) 0.57 - 1.00 mg/dL    Sodium 134 (L) 136 - 145 mmol/L    Potassium 4.6 3.5 - 5.2 mmol/L    Chloride 100 98 - 107 mmol/L    CO2 24.0 22.0 - 29.0 mmol/L    Calcium 8.7 8.6 - 10.5 mg/dL    BUN/Creatinine Ratio 18.2 7.0 - 25.0    Anion Gap 10.0 5.0 - 15.0 mmol/L    eGFR 39.9 (L) >60.0 mL/min/1.73   Magnesium    Collection Time: 06/27/25  5:53 AM    Specimen: Blood   Result Value Ref Range    Magnesium 2.3 1.6 - 2.4 mg/dL   CBC Auto Differential    Collection Time: 06/27/25  5:53 AM    Specimen: Blood   Result Value Ref Range    WBC 10.80 3.40 - 10.80 10*3/mm3    RBC 4.03 3.77 - 5.28 10*6/mm3    Hemoglobin 10.0 (L) 12.0 - 15.9 g/dL    Hematocrit 34.5 34.0 - 46.6 %    MCV 85.6 79.0 - 97.0 fL    MCH 24.8 (L) 26.6 - 33.0 pg    MCHC 29.0 (L) 31.5 - 35.7 g/dL    RDW 15.7 (H) 12.3 - 15.4 %    RDW-SD 49.1 37.0 - 54.0 fl    MPV 9.6 6.0 - 12.0 fL    Platelets 381 140 - 450 10*3/mm3    Neutrophil % 75.5 42.7 - 76.0 %    Lymphocyte % 13.1 (L) 19.6 - 45.3 %    Monocyte % 10.1 5.0 - 12.0 %    Eosinophil % 0.7 0.3 - 6.2 %    Basophil % 0.3 0.0 - 1.5 %    Immature Grans % 0.3 0.0 - 0.5 %    Neutrophils, Absolute 8.15 (H) 1.70 - 7.00 10*3/mm3    Lymphocytes, Absolute 1.42 0.70 - 3.10 10*3/mm3    Monocytes, Absolute 1.09 (H) 0.10 - 0.90 10*3/mm3    Eosinophils, Absolute 0.08 0.00 - 0.40 10*3/mm3    Basophils, Absolute 0.03 0.00 - 0.20 10*3/mm3    Immature Grans, Absolute 0.03 0.00 - 0.05 10*3/mm3    nRBC 0.0 0.0 - 0.2 /100 WBC        IMPRESSION:  Patient is a 75 y.o. Not  or  female with a right comminuted displaced fracture of the proximal right humerus adjacent to the distal margin of the humeral stem component of the shoulder prosthesis.     PLAN:   Admitted to: Romero Casey MD  Discussed with patient that the plan is to continue pain control while transitioning to oral pain meds today. We are going to work on decreasing swelling by using ice. Plan is for Dr. Santamaria to surgically fix with an ORIF of the right humerus outpatient. Patient may be discharged later today if her pain is well-controlled on oral medication.  Consent: A thorough discussion of the risks and benefits of surgery was had.  Risks included but were not limited to the risk of anesthesia, infection, neurovascular damage, malunion/nonunion, hardware prominence, need for further procedures, and others. They understood the risks and wished to proceed with surgery.      CECIL Winter  Orthopaedic Surgery  [facility]  [phone] - [place] [facility]  [phone] - [place] Office

## 2025-06-27 NOTE — PLAN OF CARE
Goal Outcome Evaluation:                   Problem: Adult Inpatient Plan of Care  Goal: Plan of Care Review  Outcome: Met  Goal: Patient-Specific Goal (Individualized)  Outcome: Met  Goal: Absence of Hospital-Acquired Illness or Injury  Outcome: Met  Goal: Optimal Comfort and Wellbeing  Outcome: Met  Goal: Readiness for Transition of Care  Outcome: Met     Problem: Fall Injury Risk  Goal: Absence of Fall and Fall-Related Injury  Outcome: Met     Problem: Pain Acute  Goal: Optimal Pain Control and Function  Outcome: Met     Problem: Comorbidity Management  Goal: Blood Pressure in Desired Range  Outcome: Met       D/c to home

## 2025-06-27 NOTE — PLAN OF CARE
Goal Outcome Evaluation:  Plan of Care Reviewed With: patient, spouse           Outcome Evaluation: Patient is a 76 y/o F who was admitted to [facility] on 6/26/25 due to a fall resulting in pt landing on her R side.  Pt reported landing on her R shoulder/hip/and slightly hit the R side of her head. No LOC reported.  Imaging was performed showing a comminuted and displaced fracture of pt's R proximal humerus.  Surgery is planned on an outpatient basis by Dr. Santamaria on Wednesday this week.  Pt has a h/o B TSA and B TKA also.  She recently finished therapy for her L TSA.  At baseline, pt is independent with all mobility and does not use an AD.  Pt still drives.  During today's evaluatoin pt was able to perform bed mobility with min to mod A supine to sit, min A sit to stand with HHA LUE, and she was able to ambulate with min A with LUE HHA for 75 feet.  Pt was unsteady on her feet and required mod A on 2 different occasions due to slight LOB.  Pt with narrow CEASAR and took very small steps.  She was very guarded in movement due to wanting to protect her R shoulder.  Upon entering pt's room, sling was in place on pt's R UE.  At this time, PT recommends pt d/c home with spouse.  Pt is to have surgery Wednesday, and when pt is medically appropriate, she plans to resume OPPT with her previous PT.  PT will sign off/complete orders at this time as d/c orders are in place.    Anticipated Discharge Disposition (PT): home with assist

## 2025-06-27 NOTE — CASE MANAGEMENT/SOCIAL WORK
Case Management Discharge Note      Final Note: Routine home         Selected Continued Care - Discharged on 6/27/2025 Admission date: 6/26/2025 - Discharge disposition: Home or Self Care         Transportation Services  Transportation: Private Transportation  Private: Car    Final Discharge Disposition Code: 01 - home or self-care

## 2025-06-30 ENCOUNTER — HOSPITAL ENCOUNTER (OUTPATIENT)
Dept: GENERAL RADIOLOGY | Facility: HOSPITAL | Age: 75
Discharge: HOME | End: 2025-06-30
Payer: MEDICARE

## 2025-06-30 ENCOUNTER — PRE-ADMISSION TESTING (OUTPATIENT)
Dept: PREADMISSION TESTING | Facility: HOSPITAL | Age: 75
End: 2025-06-30
Payer: MEDICARE

## 2025-06-30 VITALS
SYSTOLIC BLOOD PRESSURE: 157 MMHG | BODY MASS INDEX: 28.74 KG/M2 | OXYGEN SATURATION: 97 % | DIASTOLIC BLOOD PRESSURE: 75 MMHG | WEIGHT: 152.2 LBS | HEART RATE: 84 BPM | RESPIRATION RATE: 18 BRPM | TEMPERATURE: 98.3 F | HEIGHT: 61 IN

## 2025-06-30 LAB
BILIRUB UR QL STRIP: NEGATIVE
CLARITY UR: CLEAR
COLOR UR: YELLOW
GLUCOSE UR STRIP-MCNC: NEGATIVE MG/DL
HGB UR QL STRIP.AUTO: NEGATIVE
KETONES UR QL STRIP: ABNORMAL
LEUKOCYTE ESTERASE UR QL STRIP.AUTO: NEGATIVE
NITRITE UR QL STRIP: NEGATIVE
PH UR STRIP.AUTO: 6.5 [PH] (ref 5–8)
PROT UR QL STRIP: ABNORMAL
SP GR UR STRIP: 1.02 (ref 1–1.03)
UROBILINOGEN UR QL STRIP: ABNORMAL

## 2025-06-30 PROCEDURE — 81003 URINALYSIS AUTO W/O SCOPE: CPT

## 2025-06-30 PROCEDURE — 71046 X-RAY EXAM CHEST 2 VIEWS: CPT

## 2025-06-30 RX ORDER — ALBUTEROL SULFATE 90 UG/1
2 INHALANT RESPIRATORY (INHALATION) EVERY 4 HOURS PRN
COMMUNITY

## 2025-06-30 NOTE — DISCHARGE INSTRUCTIONS
Take the following medications the morning of surgery:    AMLODIPINE  OXYCODONE    If you are on an Aspirin or a Blood Thinner please clarify with the surgeon and prescribing physician if and when you are to hold the medication or if you are to continue the medication.  If you are on prescription narcotic pain medication to control your pain you may also take that medication the morning of surgery.      General Instructions:     Do not eat solid food after midnight the night before surgery.  Clear liquids day of surgery are allowed but must be stopped at least two hours before your hospital arrival time.       Allowed clear liquids      Water, sodas, and tea or coffee with no cream or milk added.       12 to 20 ounces of a clear liquid that contains carbohydrates is recommended.  If non-diabetic, have Gatorade or Powerade.  If diabetic, have G2 or Powerade Zero.     Do not have liquids red in color.  Do not consume chicken, beef, pork or vegetable broth or bouillon cubes of any variety as they are not considered clear liquids and are not allowed.      Infants may have breast milk up to four hours before surgery.  Infants drinking formula may drink formula up to six hours before surgery.   Patients who avoid smoking, chewing tobacco and alcohol for 4 weeks prior to surgery have a reduced risk of post-operative complications.  Quit smoking as many days before surgery as you can.  Do not smoke, use chewing tobacco or drink alcohol the day of surgery.   If applicable bring your C-PAP/ BI-PAP machine in with you to preop day of surgery.  Bring any papers given to you in the doctor’s office.  Wear clean comfortable clothes.  Do not wear contact lenses, false eyelashes or make-up.  Bring a case for your glasses.   Bring crutches or walker if applicable.  Remove all piercings.  Leave jewelry and any other valuables at home.  Hair extensions with metal clips must be removed prior to surgery.  The Pre-Admission Testing nurse  will instruct you to bring medications if unable to obtain an accurate list in Pre-Admission Testing.    Day of surgery you will need to let the preoperative nurse know the last time you took each of your medications.  To ensure a safe environment for patients and staff, we kindly ask that children under the age of 16 not accompany patients.  If you must bring a dependent child or dependent adult please ensure a responsible adult, other than yourself, is present to supervise them.      If you were given a blood bank ID arm band remember to bring it with you the day of surgery.    Day of surgery:  Your arrival time is approximately two hours before your scheduled surgery time.  Upon arrival, a Pre-op nurse and Anesthesiologist will review your health history, obtain vital signs, and answer questions you may have.  The only belongings needed at this time will be a list of your home medications and if applicable your C-PAP/BI-PAP machine.  A Pre-op nurse will start an IV and you may receive medication in preparation for surgery, including something to help you relax.     Please be aware that surgery does come with discomfort.  We want to make every effort to control your discomfort so please discuss any uncontrolled symptoms with your nurse.   Your doctor will most likely have prescribed pain medications.      If you are going home after surgery you will receive individualized written care instructions before being discharged.  A responsible adult must drive you to and from the hospital on the day of your surgery and ideally stay with you through the night.  Discharge prescriptions can be filled by the hospital pharmacy during regular pharmacy hours.  If you are having surgery late in the day/evening your prescription may be e-prescribed to your pharmacy.  Please verify your pharmacy hours or chose a 24 hour pharmacy to avoid not having access to your prescription because your pharmacy has closed for the day.    If you  are staying overnight following surgery, you will be transported to your hospital room following the recovery period.  [facility] has all private rooms.    If you have any questions please call Pre-Admission Testing at [phone].  Deductibles and co-payments are collected on the day of service. Please be prepared to pay the required co-pay, deductible or deposit on the day of service as defined by your plan.    Call your surgeon immediately if you experience any of the following symptoms:  Sore Throat  Shortness of Breath or difficulty breathing  Cough  Chills  Body soreness or muscle pain  Headache  Fever  New loss of taste or smell  Do not arrive for your surgery ill.  Your procedure will need to be rescheduled to another time.  You will need to call your physician before the day of surgery to avoid any unnecessary exposure to hospital staff as well as other patients.        PREVENTING INFECTION IN SHOULDER SURGICAL SITES     C. acnes is a bacteria that lives deep within follicles and pores of the skin. It is found in large numbers on the skin of the face, axilla (armpit), chest and back and is the primary bacteria to cause a surgical site infection after shoulder surgery.      Use of a Benzoyl Peroxide solution prior to shoulder surgery decreases C. acnes and reduces post-op infections.   Your surgeon has ordered 5% Benzoyl Peroxide wash to be used three times prior to your surgery.     Please read the following instructions carefully and bring this form with you the day of surgery.     General bathing instructions starting two days before your surgery:    Shower using a fresh bar of anti-bacterial soap (such as Dial) and clean washcloth.  Pay special attention to the neck, shoulder and armpit area.   Wash your hair as usual with your regular shampoo.   Rinse hair and body thoroughly with warm water (not hot water) to remove shampoo and residue.   Dry with a clean towel.              Sleep in a  clean bed with clean clothing.  Do not allow pets to sleep with you.     For 2 days before surgery, avoid shaving with a razor because the razor can irritate skin and make it easier to develop an infection.    Any areas of open skin can increase the risk of a post-operative wound infection by allowing bacteria to enter and travel throughout the body.  Notify your surgeon if you have any skin wounds / rashes even if it is not near the expected surgical site.  The area will need assessed to determine if surgery should be delayed until it is healed.      First application of 5% Benzoyl Peroxide Wash two nights before surgery:                                                                Wash neck, shoulder (front, back, side) and armpit   with warm water, rinse and dry - see picture.  Gently wash the same areas with the Benzoyl Peroxide   cleanser going away from the neck for 10-20 seconds.   Work into a full lather and leave on the skin for   2 minutes for greatest effect.  Rinse thoroughly with warm water, not hot water.                     Pat dry with a clean towel.                                                            Wash your hands thoroughly.  Do not apply lotion, powder, perfume or deodorant.   Put on clean clothes.    Second application the night before surgery:  Repeat the above steps.        Third application morning of surgery:  Repeat the above steps.    Due to shoulder pain or decreased range of motion of your shoulder and arm, you may need assistance washing under the arm or the back portion of your shoulder.     Avoid further washing the areas of the skin treated with Benzoyl Peroxide for at least 1 hour.    For your convenience, you may purchase Benzoyl Peroxide at [facility] retail pharmacy.     Warning:  Let your physician know if you are allergic to Benzoyl Peroxide or have very sensitive skin and cannot use it.   Stop using and contact your surgeon if you experience any excessive  scaling, itching, swelling, skin irritation or other signs of a reaction.  Keep out of eyes, ears, nose and mouth.  Do not apply to sunburned, irritated or broken area on the skin.  Avoid unwanted problems with bleaching effect by following these tips:  Wash hands after each use.  Avoid contact with hair, clothing, furnishings or carpeting.  Wear clean, old t-shirt or clothing to bed.   Use clean, old white pillow cases and sheets to avoid discoloring your bed linens.                                                                                                                                                                                                                                                                                   Please complete the checklist below, bring it with you to the hospital                               the day of surgery and give to the Pre-op Nurse     Preoperative Skin Prep Checklist        Patient Name Label             Enter dates and ?  boxes to indicate completed    Surgery Date: _______________ Regular Shower  Benzoyl Peroxide Wash   First Application:  2 days before_______________  (Stop shaving all body parts)           Morning or Evening                        Evening    Second Application:  1 day  before________________        Morning or Evening                          Evening   Third Application:  Day of Surgery______________                           Morning                   Morning

## 2025-07-02 ENCOUNTER — APPOINTMENT (OUTPATIENT)
Dept: GENERAL RADIOLOGY | Facility: HOSPITAL | Age: 75
End: 2025-07-02
Payer: MEDICARE

## 2025-07-02 ENCOUNTER — ANESTHESIA (OUTPATIENT)
Dept: PERIOP | Facility: HOSPITAL | Age: 75
End: 2025-07-02
Payer: MEDICARE

## 2025-07-02 ENCOUNTER — HOSPITAL ENCOUNTER (OUTPATIENT)
Facility: HOSPITAL | Age: 75
Setting detail: OBSERVATION
Discharge: HOME | End: 2025-07-03
Attending: ORTHOPAEDIC SURGERY | Admitting: ORTHOPAEDIC SURGERY
Payer: MEDICARE

## 2025-07-02 ENCOUNTER — ANESTHESIA EVENT (OUTPATIENT)
Dept: PERIOP | Facility: HOSPITAL | Age: 75
End: 2025-07-02
Payer: MEDICARE

## 2025-07-02 DIAGNOSIS — Z96.611 S/P REVERSE TOTAL SHOULDER ARTHROPLASTY, RIGHT: Primary | ICD-10-CM

## 2025-07-02 LAB
ABO GROUP BLD: NORMAL
BLD GP AB SCN SERPL QL: NEGATIVE
RH BLD: POSITIVE
T&S EXPIRATION DATE: NORMAL

## 2025-07-02 PROCEDURE — 25010000002 LIDOCAINE 2% SOLUTION: Performed by: NURSE ANESTHETIST, CERTIFIED REGISTERED

## 2025-07-02 PROCEDURE — 73020 X-RAY EXAM OF SHOULDER: CPT

## 2025-07-02 PROCEDURE — 25010000002 PHENYLEPHRINE 10 MG/ML SOLUTION: Performed by: NURSE ANESTHETIST, CERTIFIED REGISTERED

## 2025-07-02 PROCEDURE — 25010000002 MIDAZOLAM PER 1 MG: Performed by: STUDENT IN AN ORGANIZED HEALTH CARE EDUCATION/TRAINING PROGRAM

## 2025-07-02 PROCEDURE — 25010000002 BUPIVACAINE (PF) 0.25 % SOLUTION: Performed by: ANESTHESIOLOGY

## 2025-07-02 PROCEDURE — 25010000002 PROPOFOL 10 MG/ML EMULSION: Performed by: NURSE ANESTHETIST, CERTIFIED REGISTERED

## 2025-07-02 PROCEDURE — 86850 RBC ANTIBODY SCREEN: CPT | Performed by: ORTHOPAEDIC SURGERY

## 2025-07-02 PROCEDURE — 86901 BLOOD TYPING SEROLOGIC RH(D): CPT | Performed by: ORTHOPAEDIC SURGERY

## 2025-07-02 PROCEDURE — 25010000002 DEXAMETHASONE PER 1 MG: Performed by: NURSE ANESTHETIST, CERTIFIED REGISTERED

## 2025-07-02 PROCEDURE — C1776 JOINT DEVICE (IMPLANTABLE): HCPCS | Performed by: ORTHOPAEDIC SURGERY

## 2025-07-02 PROCEDURE — 86900 BLOOD TYPING SEROLOGIC ABO: CPT | Performed by: ORTHOPAEDIC SURGERY

## 2025-07-02 PROCEDURE — 25810000003 LACTATED RINGERS PER 1000 ML: Performed by: STUDENT IN AN ORGANIZED HEALTH CARE EDUCATION/TRAINING PROGRAM

## 2025-07-02 PROCEDURE — C1713 ANCHOR/SCREW BN/BN,TIS/BN: HCPCS | Performed by: ORTHOPAEDIC SURGERY

## 2025-07-02 PROCEDURE — 25010000002 CEFAZOLIN PER 500 MG: Performed by: ORTHOPAEDIC SURGERY

## 2025-07-02 PROCEDURE — 25010000002 FENTANYL CITRATE (PF) 50 MCG/ML SOLUTION: Performed by: STUDENT IN AN ORGANIZED HEALTH CARE EDUCATION/TRAINING PROGRAM

## 2025-07-02 PROCEDURE — 25010000002 SUGAMMADEX 200 MG/2ML SOLUTION: Performed by: ANESTHESIOLOGY

## 2025-07-02 PROCEDURE — 25010000002 VANCOMYCIN 1 G RECONSTITUTED SOLUTION: Performed by: ORTHOPAEDIC SURGERY

## 2025-07-02 PROCEDURE — 25010000002 FENTANYL CITRATE (PF) 50 MCG/ML SOLUTION: Performed by: NURSE ANESTHETIST, CERTIFIED REGISTERED

## 2025-07-02 PROCEDURE — 76000 FLUOROSCOPY <1 HR PHYS/QHP: CPT

## 2025-07-02 PROCEDURE — G0378 HOSPITAL OBSERVATION PER HR: HCPCS

## 2025-07-02 PROCEDURE — 25010000002 BUPIVACAINE LIPOSOME 1.3 % SUSPENSION: Performed by: ANESTHESIOLOGY

## 2025-07-02 DEVICE — IMPLANTABLE DEVICE
Type: IMPLANTABLE DEVICE | Site: SHOULDER | Status: FUNCTIONAL
Brand: EQUINOXE

## 2025-07-02 DEVICE — PALACOS® LV+G IS A SLOW-CURING, RADIOPAQUE, POLY(METHYL METHACRYLATE)-BASED BONE CEMENT. IT CONTAINS THE AMINOGLYCOSIDE ANTIBIOTIC GENTAMICIN.PALACOS® LV+G CONTAINS THE X-RAY CONTRAST MEDIUM ZIRCONIUM DIOXIDE. TO IMPROVE VISIBILITY IN THE SURGICAL FIELD PALACOS® LV+G HAS BEEN COLOURED WITH CHLOROPHYLL (E141).THE BONE CEMENT IS PREPARED DIRECTLY BEFORE USE BY MIXING A POLYMER POWDER COMPONENT WITH A LIQUID MONOMER COMPONENT. A DUCTILE DOUGH FORMS WHICH CURES WITHIN A FEW MINUTES.
Type: IMPLANTABLE DEVICE | Site: SHOULDER | Status: FUNCTIONAL
Brand: PALACOS®

## 2025-07-02 DEVICE — HUMERAL LINER
Type: IMPLANTABLE DEVICE | Site: SHOULDER | Status: FUNCTIONAL
Brand: EQUINOXE®

## 2025-07-02 DEVICE — HUMERAL ADAPTER TRAY
Type: IMPLANTABLE DEVICE | Site: SHOULDER | Status: FUNCTIONAL
Brand: EQUINOXE®

## 2025-07-02 DEVICE — BLUE CO-BRAID POLYETHYLENE SIZE 5 38" K-60 NEEDLE BIOMET
Type: IMPLANTABLE DEVICE | Site: SHOULDER | Status: FUNCTIONAL
Brand: TELEFLEX

## 2025-07-02 DEVICE — ACCORD 2.0MM COBALT CHROME CABLE WITH CLAMP
Type: IMPLANTABLE DEVICE | Site: SHOULDER | Status: FUNCTIONAL
Brand: ACCORD

## 2025-07-02 RX ORDER — DIPHENHYDRAMINE HYDROCHLORIDE 50 MG/ML
12.5 INJECTION, SOLUTION INTRAMUSCULAR; INTRAVENOUS
Status: DISCONTINUED | OUTPATIENT
Start: 2025-07-02 | End: 2025-07-02 | Stop reason: HOSPADM

## 2025-07-02 RX ORDER — BUPROPION HYDROCHLORIDE 300 MG/1
300 TABLET ORAL NIGHTLY
Status: DISCONTINUED | OUTPATIENT
Start: 2025-07-02 | End: 2025-07-03 | Stop reason: HOSPADM

## 2025-07-02 RX ORDER — FENTANYL CITRATE 50 UG/ML
50 INJECTION, SOLUTION INTRAMUSCULAR; INTRAVENOUS ONCE AS NEEDED
Status: COMPLETED | OUTPATIENT
Start: 2025-07-02 | End: 2025-07-02

## 2025-07-02 RX ORDER — AMLODIPINE BESYLATE 5 MG/1
5 TABLET ORAL 2 TIMES DAILY
Status: DISCONTINUED | OUTPATIENT
Start: 2025-07-02 | End: 2025-07-03 | Stop reason: HOSPADM

## 2025-07-02 RX ORDER — ROCURONIUM BROMIDE 10 MG/ML
INJECTION, SOLUTION INTRAVENOUS AS NEEDED
Status: DISCONTINUED | OUTPATIENT
Start: 2025-07-02 | End: 2025-07-02 | Stop reason: SURG

## 2025-07-02 RX ORDER — SODIUM CHLORIDE, SODIUM LACTATE, POTASSIUM CHLORIDE, CALCIUM CHLORIDE 600; 310; 30; 20 MG/100ML; MG/100ML; MG/100ML; MG/100ML
9 INJECTION, SOLUTION INTRAVENOUS CONTINUOUS
Status: ACTIVE | OUTPATIENT
Start: 2025-07-02 | End: 2025-07-03

## 2025-07-02 RX ORDER — ATROPINE SULFATE 0.4 MG/ML
0.4 INJECTION, SOLUTION INTRAMUSCULAR; INTRAVENOUS; SUBCUTANEOUS ONCE AS NEEDED
Status: DISCONTINUED | OUTPATIENT
Start: 2025-07-02 | End: 2025-07-02 | Stop reason: HOSPADM

## 2025-07-02 RX ORDER — ONDANSETRON 2 MG/ML
4 INJECTION INTRAMUSCULAR; INTRAVENOUS ONCE AS NEEDED
Status: DISCONTINUED | OUTPATIENT
Start: 2025-07-02 | End: 2025-07-02 | Stop reason: HOSPADM

## 2025-07-02 RX ORDER — HYDROMORPHONE HYDROCHLORIDE 1 MG/ML
0.5 INJECTION, SOLUTION INTRAMUSCULAR; INTRAVENOUS; SUBCUTANEOUS
Status: DISCONTINUED | OUTPATIENT
Start: 2025-07-02 | End: 2025-07-02 | Stop reason: HOSPADM

## 2025-07-02 RX ORDER — PROMETHAZINE HYDROCHLORIDE 25 MG/1
25 TABLET ORAL ONCE AS NEEDED
Status: DISCONTINUED | OUTPATIENT
Start: 2025-07-02 | End: 2025-07-02 | Stop reason: HOSPADM

## 2025-07-02 RX ORDER — DROPERIDOL 2.5 MG/ML
0.62 INJECTION, SOLUTION INTRAMUSCULAR; INTRAVENOUS
Status: DISCONTINUED | OUTPATIENT
Start: 2025-07-02 | End: 2025-07-02 | Stop reason: HOSPADM

## 2025-07-02 RX ORDER — VANCOMYCIN HYDROCHLORIDE 1 G/20ML
INJECTION, POWDER, LYOPHILIZED, FOR SOLUTION INTRAVENOUS AS NEEDED
Status: DISCONTINUED | OUTPATIENT
Start: 2025-07-02 | End: 2025-07-02 | Stop reason: HOSPADM

## 2025-07-02 RX ORDER — ALBUTEROL SULFATE 90 UG/1
2 INHALANT RESPIRATORY (INHALATION) EVERY 4 HOURS PRN
Status: DISCONTINUED | OUTPATIENT
Start: 2025-07-02 | End: 2025-07-03 | Stop reason: HOSPADM

## 2025-07-02 RX ORDER — ONDANSETRON 4 MG/1
4 TABLET, ORALLY DISINTEGRATING ORAL EVERY 6 HOURS PRN
Status: DISCONTINUED | OUTPATIENT
Start: 2025-07-02 | End: 2025-07-03 | Stop reason: HOSPADM

## 2025-07-02 RX ORDER — DEXAMETHASONE SODIUM PHOSPHATE 4 MG/ML
INJECTION, SOLUTION INTRA-ARTICULAR; INTRALESIONAL; INTRAMUSCULAR; INTRAVENOUS; SOFT TISSUE AS NEEDED
Status: DISCONTINUED | OUTPATIENT
Start: 2025-07-02 | End: 2025-07-02 | Stop reason: SURG

## 2025-07-02 RX ORDER — HYDRALAZINE HYDROCHLORIDE 20 MG/ML
5 INJECTION INTRAMUSCULAR; INTRAVENOUS
Status: DISCONTINUED | OUTPATIENT
Start: 2025-07-02 | End: 2025-07-02 | Stop reason: HOSPADM

## 2025-07-02 RX ORDER — MORPHINE SULFATE 2 MG/ML
6 INJECTION, SOLUTION INTRAMUSCULAR; INTRAVENOUS
Status: DISCONTINUED | OUTPATIENT
Start: 2025-07-02 | End: 2025-07-03 | Stop reason: HOSPADM

## 2025-07-02 RX ORDER — OXYCODONE AND ACETAMINOPHEN 5; 325 MG/1; MG/1
2 TABLET ORAL EVERY 4 HOURS PRN
Status: DISCONTINUED | OUTPATIENT
Start: 2025-07-02 | End: 2025-07-03 | Stop reason: HOSPADM

## 2025-07-02 RX ORDER — LIDOCAINE HYDROCHLORIDE 10 MG/ML
0.5 INJECTION, SOLUTION INFILTRATION; PERINEURAL ONCE AS NEEDED
Status: DISCONTINUED | OUTPATIENT
Start: 2025-07-02 | End: 2025-07-02 | Stop reason: HOSPADM

## 2025-07-02 RX ORDER — MIDAZOLAM HYDROCHLORIDE 1 MG/ML
0.5 INJECTION, SOLUTION INTRAMUSCULAR; INTRAVENOUS
Status: DISCONTINUED | OUTPATIENT
Start: 2025-07-02 | End: 2025-07-02 | Stop reason: HOSPADM

## 2025-07-02 RX ORDER — MONTELUKAST SODIUM 10 MG/1
10 TABLET ORAL NIGHTLY
Status: DISCONTINUED | OUTPATIENT
Start: 2025-07-02 | End: 2025-07-03 | Stop reason: HOSPADM

## 2025-07-02 RX ORDER — PHENYLEPHRINE HYDROCHLORIDE 10 MG/ML
INJECTION INTRAVENOUS AS NEEDED
Status: DISCONTINUED | OUTPATIENT
Start: 2025-07-02 | End: 2025-07-02 | Stop reason: SURG

## 2025-07-02 RX ORDER — PROMETHAZINE HYDROCHLORIDE 25 MG/1
25 SUPPOSITORY RECTAL ONCE AS NEEDED
Status: DISCONTINUED | OUTPATIENT
Start: 2025-07-02 | End: 2025-07-02 | Stop reason: HOSPADM

## 2025-07-02 RX ORDER — SODIUM CHLORIDE 0.9 % (FLUSH) 0.9 %
3 SYRINGE (ML) INJECTION EVERY 12 HOURS SCHEDULED
Status: DISCONTINUED | OUTPATIENT
Start: 2025-07-02 | End: 2025-07-02 | Stop reason: HOSPADM

## 2025-07-02 RX ORDER — FENTANYL CITRATE 50 UG/ML
50 INJECTION, SOLUTION INTRAMUSCULAR; INTRAVENOUS
Status: DISCONTINUED | OUTPATIENT
Start: 2025-07-02 | End: 2025-07-02 | Stop reason: HOSPADM

## 2025-07-02 RX ORDER — ACETAMINOPHEN 500 MG
1000 TABLET ORAL ONCE
Status: COMPLETED | OUTPATIENT
Start: 2025-07-02 | End: 2025-07-02

## 2025-07-02 RX ORDER — SODIUM CHLORIDE 0.9 % (FLUSH) 0.9 %
3-10 SYRINGE (ML) INJECTION AS NEEDED
Status: DISCONTINUED | OUTPATIENT
Start: 2025-07-02 | End: 2025-07-02 | Stop reason: HOSPADM

## 2025-07-02 RX ORDER — NALOXONE HCL 0.4 MG/ML
0.2 VIAL (ML) INJECTION AS NEEDED
Status: DISCONTINUED | OUTPATIENT
Start: 2025-07-02 | End: 2025-07-02 | Stop reason: HOSPADM

## 2025-07-02 RX ORDER — OXYCODONE AND ACETAMINOPHEN 7.5; 325 MG/1; MG/1
1 TABLET ORAL EVERY 4 HOURS PRN
Status: DISCONTINUED | OUTPATIENT
Start: 2025-07-02 | End: 2025-07-02 | Stop reason: HOSPADM

## 2025-07-02 RX ORDER — OXYCODONE AND ACETAMINOPHEN 5; 325 MG/1; MG/1
1 TABLET ORAL EVERY 4 HOURS PRN
Status: DISCONTINUED | OUTPATIENT
Start: 2025-07-02 | End: 2025-07-03 | Stop reason: HOSPADM

## 2025-07-02 RX ORDER — EPHEDRINE SULFATE 50 MG/ML
5 INJECTION, SOLUTION INTRAVENOUS ONCE AS NEEDED
Status: DISCONTINUED | OUTPATIENT
Start: 2025-07-02 | End: 2025-07-02 | Stop reason: HOSPADM

## 2025-07-02 RX ORDER — LIDOCAINE HYDROCHLORIDE 20 MG/ML
INJECTION, SOLUTION INFILTRATION; PERINEURAL AS NEEDED
Status: DISCONTINUED | OUTPATIENT
Start: 2025-07-02 | End: 2025-07-02 | Stop reason: SURG

## 2025-07-02 RX ORDER — PANTOPRAZOLE SODIUM 40 MG/1
40 TABLET, DELAYED RELEASE ORAL
Status: DISCONTINUED | OUTPATIENT
Start: 2025-07-03 | End: 2025-07-03 | Stop reason: HOSPADM

## 2025-07-02 RX ORDER — FLUMAZENIL 0.1 MG/ML
0.2 INJECTION INTRAVENOUS AS NEEDED
Status: DISCONTINUED | OUTPATIENT
Start: 2025-07-02 | End: 2025-07-02 | Stop reason: HOSPADM

## 2025-07-02 RX ORDER — SODIUM CHLORIDE 450 MG/100ML
75 INJECTION, SOLUTION INTRAVENOUS CONTINUOUS
Status: DISCONTINUED | OUTPATIENT
Start: 2025-07-02 | End: 2025-07-03 | Stop reason: HOSPADM

## 2025-07-02 RX ORDER — HYDROCODONE BITARTRATE AND ACETAMINOPHEN 5; 325 MG/1; MG/1
1 TABLET ORAL ONCE AS NEEDED
Status: COMPLETED | OUTPATIENT
Start: 2025-07-02 | End: 2025-07-02

## 2025-07-02 RX ORDER — IPRATROPIUM BROMIDE AND ALBUTEROL SULFATE 2.5; .5 MG/3ML; MG/3ML
3 SOLUTION RESPIRATORY (INHALATION) ONCE AS NEEDED
Status: DISCONTINUED | OUTPATIENT
Start: 2025-07-02 | End: 2025-07-02 | Stop reason: HOSPADM

## 2025-07-02 RX ORDER — ONDANSETRON 2 MG/ML
4 INJECTION INTRAMUSCULAR; INTRAVENOUS EVERY 6 HOURS PRN
Status: DISCONTINUED | OUTPATIENT
Start: 2025-07-02 | End: 2025-07-03 | Stop reason: HOSPADM

## 2025-07-02 RX ORDER — ESTRADIOL 0.5 MG/1
0.5 TABLET ORAL DAILY
Status: DISCONTINUED | OUTPATIENT
Start: 2025-07-02 | End: 2025-07-03 | Stop reason: HOSPADM

## 2025-07-02 RX ORDER — TRIAMTERENE CAPSULES 50 MG/1
50 CAPSULE ORAL NIGHTLY
Status: DISCONTINUED | OUTPATIENT
Start: 2025-07-02 | End: 2025-07-03 | Stop reason: HOSPADM

## 2025-07-02 RX ORDER — MORPHINE SULFATE 2 MG/ML
4 INJECTION, SOLUTION INTRAMUSCULAR; INTRAVENOUS
Status: DISCONTINUED | OUTPATIENT
Start: 2025-07-02 | End: 2025-07-03 | Stop reason: HOSPADM

## 2025-07-02 RX ORDER — DIAZEPAM 5 MG/1
5 TABLET ORAL EVERY 6 HOURS PRN
Status: DISCONTINUED | OUTPATIENT
Start: 2025-07-02 | End: 2025-07-03 | Stop reason: HOSPADM

## 2025-07-02 RX ORDER — TRAZODONE HYDROCHLORIDE 50 MG/1
50 TABLET ORAL NIGHTLY
Status: DISCONTINUED | OUTPATIENT
Start: 2025-07-02 | End: 2025-07-03 | Stop reason: HOSPADM

## 2025-07-02 RX ORDER — ASPIRIN 81 MG/1
81 TABLET ORAL DAILY
Status: DISCONTINUED | OUTPATIENT
Start: 2025-07-03 | End: 2025-07-03 | Stop reason: HOSPADM

## 2025-07-02 RX ORDER — EPHEDRINE SULFATE 50 MG/ML
INJECTION INTRAVENOUS AS NEEDED
Status: DISCONTINUED | OUTPATIENT
Start: 2025-07-02 | End: 2025-07-02 | Stop reason: SURG

## 2025-07-02 RX ORDER — NALOXONE HCL 0.4 MG/ML
0.4 VIAL (ML) INJECTION
Status: DISCONTINUED | OUTPATIENT
Start: 2025-07-02 | End: 2025-07-03 | Stop reason: HOSPADM

## 2025-07-02 RX ORDER — TRANEXAMIC ACID 100 MG/ML
INJECTION, SOLUTION INTRAVENOUS AS NEEDED
Status: DISCONTINUED | OUTPATIENT
Start: 2025-07-02 | End: 2025-07-02 | Stop reason: SURG

## 2025-07-02 RX ORDER — PROPOFOL 10 MG/ML
VIAL (ML) INTRAVENOUS AS NEEDED
Status: DISCONTINUED | OUTPATIENT
Start: 2025-07-02 | End: 2025-07-02 | Stop reason: SURG

## 2025-07-02 RX ORDER — LABETALOL HYDROCHLORIDE 5 MG/ML
5 INJECTION, SOLUTION INTRAVENOUS
Status: DISCONTINUED | OUTPATIENT
Start: 2025-07-02 | End: 2025-07-02 | Stop reason: HOSPADM

## 2025-07-02 RX ORDER — DICYCLOMINE HYDROCHLORIDE 10 MG/1
10 CAPSULE ORAL
Status: DISCONTINUED | OUTPATIENT
Start: 2025-07-02 | End: 2025-07-03 | Stop reason: HOSPADM

## 2025-07-02 RX ORDER — BUPIVACAINE HYDROCHLORIDE 2.5 MG/ML
INJECTION, SOLUTION EPIDURAL; INFILTRATION; INTRACAUDAL; PERINEURAL
Status: COMPLETED | OUTPATIENT
Start: 2025-07-02 | End: 2025-07-02

## 2025-07-02 RX ADMIN — PROPOFOL 150 MG: 10 INJECTION, EMULSION INTRAVENOUS at 13:08

## 2025-07-02 RX ADMIN — MIDAZOLAM 0.5 MG: 1 INJECTION INTRAMUSCULAR; INTRAVENOUS at 12:15

## 2025-07-02 RX ADMIN — EPHEDRINE SULFATE 5 MG: 50 INJECTION INTRAVENOUS at 13:55

## 2025-07-02 RX ADMIN — BUPIVACAINE 10 ML: 13.3 INJECTION, SUSPENSION, LIPOSOMAL INFILTRATION at 12:29

## 2025-07-02 RX ADMIN — LIDOCAINE HYDROCHLORIDE 70 MG: 20 INJECTION, SOLUTION INFILTRATION; PERINEURAL at 13:08

## 2025-07-02 RX ADMIN — HYDROCODONE BITARTRATE AND ACETAMINOPHEN 1 TABLET: 5; 325 TABLET ORAL at 16:21

## 2025-07-02 RX ADMIN — SODIUM CHLORIDE, POTASSIUM CHLORIDE, SODIUM LACTATE AND CALCIUM CHLORIDE: 600; 310; 30; 20 INJECTION, SOLUTION INTRAVENOUS at 14:10

## 2025-07-02 RX ADMIN — TRANEXAMIC ACID 1000 MG: 100 INJECTION INTRAVENOUS at 13:43

## 2025-07-02 RX ADMIN — DEXAMETHASONE SODIUM PHOSPHATE 8 MG: 4 INJECTION, SOLUTION INTRA-ARTICULAR; INTRALESIONAL; INTRAMUSCULAR; INTRAVENOUS; SOFT TISSUE at 13:08

## 2025-07-02 RX ADMIN — SODIUM CHLORIDE 75 ML/HR: 4.5 INJECTION, SOLUTION INTRAVENOUS at 20:36

## 2025-07-02 RX ADMIN — TRAZODONE HYDROCHLORIDE 50 MG: 50 TABLET ORAL at 20:31

## 2025-07-02 RX ADMIN — MONTELUKAST 10 MG: 10 TABLET, FILM COATED ORAL at 20:31

## 2025-07-02 RX ADMIN — AMLODIPINE BESYLATE 5 MG: 5 TABLET ORAL at 20:31

## 2025-07-02 RX ADMIN — DICYCLOMINE HYDROCHLORIDE 10 MG: 10 CAPSULE ORAL at 20:35

## 2025-07-02 RX ADMIN — FLUOXETINE 40 MG: 20 CAPSULE ORAL at 20:31

## 2025-07-02 RX ADMIN — ACETAMINOPHEN 1000 MG: 500 TABLET, FILM COATED ORAL at 12:12

## 2025-07-02 RX ADMIN — TRIAMTERENE 50 MG: 50 CAPSULE ORAL at 20:31

## 2025-07-02 RX ADMIN — ESTRADIOL 0.5 MG: 0.5 TABLET ORAL at 20:35

## 2025-07-02 RX ADMIN — BUPROPION HYDROCHLORIDE 300 MG: 300 TABLET, EXTENDED RELEASE ORAL at 20:31

## 2025-07-02 RX ADMIN — ROCURONIUM BROMIDE 50 MG: 10 INJECTION, SOLUTION INTRAVENOUS at 13:08

## 2025-07-02 RX ADMIN — SODIUM CHLORIDE, POTASSIUM CHLORIDE, SODIUM LACTATE AND CALCIUM CHLORIDE 9 ML/HR: 600; 310; 30; 20 INJECTION, SOLUTION INTRAVENOUS at 11:46

## 2025-07-02 RX ADMIN — FENTANYL CITRATE 25 MCG: 50 INJECTION, SOLUTION INTRAMUSCULAR; INTRAVENOUS at 12:15

## 2025-07-02 RX ADMIN — ROCURONIUM BROMIDE 20 MG: 10 INJECTION, SOLUTION INTRAVENOUS at 14:32

## 2025-07-02 RX ADMIN — CEFAZOLIN 2 G: 2 INJECTION, POWDER, FOR SOLUTION INTRAMUSCULAR; INTRAVENOUS at 12:56

## 2025-07-02 RX ADMIN — BUPIVACAINE HYDROCHLORIDE 10 ML: 2.5 INJECTION, SOLUTION EPIDURAL; INFILTRATION; INTRACAUDAL; PERINEURAL at 12:29

## 2025-07-02 RX ADMIN — PHENYLEPHRINE HYDROCHLORIDE 100 MCG: 10 INJECTION INTRAVENOUS at 13:25

## 2025-07-02 RX ADMIN — CEFAZOLIN 2000 MG: 2 INJECTION, POWDER, FOR SOLUTION INTRAMUSCULAR; INTRAVENOUS at 21:59

## 2025-07-02 RX ADMIN — SUGAMMADEX 400 MG: 100 INJECTION, SOLUTION INTRAVENOUS at 15:53

## 2025-07-02 RX ADMIN — EPHEDRINE SULFATE 5 MG: 50 INJECTION INTRAVENOUS at 13:50

## 2025-07-02 RX ADMIN — FENTANYL CITRATE 50 MCG: 50 INJECTION, SOLUTION INTRAMUSCULAR; INTRAVENOUS at 16:35

## 2025-07-02 RX ADMIN — FLUOXETINE 20 MG: 20 CAPSULE ORAL at 20:31

## 2025-07-02 NOTE — ANESTHESIA POSTPROCEDURE EVALUATION
Patient: Gricel Erwin    Procedure Summary       Date: 07/02/25 Room / Location:  [facility] [address] [place] [address] [facility]    Anesthesia Start: 1258 Anesthesia Stop: 1608    Procedure: REVISION FOR FRACTURE RIGHT REVERSE TOTAL SHOULDER (Right: Shoulder) Diagnosis:     Surgeons: Barber Santamaria MD Provider: Jonathan Allan MD    Anesthesia Type: general ASA Status: 2            Anesthesia Type: general    Vitals  Vitals Value Taken Time   /68 07/02/25 16:15   Temp 37.1 °C (98.8 °F) 07/02/25 16:03   Pulse 76 07/02/25 16:28   Resp 16 07/02/25 16:10   SpO2 97 % 07/02/25 16:28   Vitals shown include unfiled device data.        Post Anesthesia Care and Evaluation    Patient location during evaluation: bedside  Patient participation: complete - patient participated  Level of consciousness: awake and alert  Pain management: adequate    Airway patency: patent  Anesthetic complications: No anesthetic complications  PONV Status: controlled  Cardiovascular status: blood pressure returned to baseline and acceptable  Respiratory status: acceptable  Hydration status: acceptable

## 2025-07-02 NOTE — ANESTHESIA PROCEDURE NOTES
Airway  Reason: elective    Date/Time: 7/2/2025 1:11 PM  Airway not difficult    General Information and Staff    Patient location during procedure: OR  Anesthesiologist: Jonathan Allan MD  CRNA/CAA: Lniette Watson CRNA    Indications and Patient Condition  Indications for airway management: airway protection    Preoxygenated: yes  MILS not maintained throughout    Mask difficulty assessment: 2 - vent by mask + OA or adjuvant +/- NMBA    Final Airway Details    Final airway type: endotracheal airway      Successful airway: ETT  Cuffed: yes   Successful intubation technique: direct laryngoscopy  Adjuncts used in placement: intubating stylet and anterior pressure/BURP  Endotracheal tube insertion site: oral  Blade: Isi  Blade size: 3  ETT size (mm): 7.0  Cormack-Lehane Classification: grade IIb - view of arytenoids or posterior of glottis only  Placement verified by: chest auscultation and capnometry   Measured from: lips  ETT/EBT  to lips (cm): 21  Number of attempts at approach: 2  Assessment: lips, teeth, and gum same as pre-op and atraumatic intubation

## 2025-07-02 NOTE — ANESTHESIA PREPROCEDURE EVALUATION
Anesthesia Evaluation     Patient summary reviewed and Nursing notes reviewed   no history of anesthetic complications:   NPO Solid Status: > 8 hours  NPO Liquid Status: > 2 hours           Airway   Mallampati: II  TM distance: >3 FB  Neck ROM: full  Dental      Pulmonary    (+) ,sleep apnea  Cardiovascular     ECG reviewed    (+) hypertension, hyperlipidemia      Neuro/Psych  GI/Hepatic/Renal/Endo    (+) GERD    Musculoskeletal     Abdominal    Substance History      OB/GYN          Other   arthritis,                       Anesthesia Plan    ASA 2     general     intravenous induction     Anesthetic plan, risks, benefits, and alternatives have been provided, discussed and informed consent has been obtained with: patient.        CODE STATUS:

## 2025-07-02 NOTE — ANESTHESIA PROCEDURE NOTES
Peripheral Block    Pre-sedation assessment completed: 7/2/2025 12:24 PM    Patient reassessed immediately prior to procedure    Patient location during procedure: pre-op  Start time: 7/2/2025 12:29 PM  Stop time: 7/2/2025 12:29 PM  Reason for block: at surgeon's request and post-op pain management  Performed by  Anesthesiologist: Kirk Cuevas MD  Preanesthetic Checklist  Completed: patient identified, IV checked, site marked, risks and benefits discussed, surgical consent, monitors and equipment checked, pre-op evaluation and timeout performed  Prep:  Sterile barriers:gloves  Prep: ChloraPrep  Patient monitoring: blood pressure monitoring, continuous pulse oximetry and EKG  Procedure    Sedation: yes    Guidance:ultrasound guided    ULTRASOUND INTERPRETATION.  Using ultrasound guidance a 22 G gauge needle was placed in close proximity to the brachial plexus nerve, at which point, under ultrasound guidance anesthetic was injected in the area of the nerve and spread of the anesthesia was seen on ultrasound in close proximity thereto.  There were no abnormalities seen on ultrasound; a digital image was taken; and the patient tolerated the procedure with no complications. Images:still images obtained    Laterality:right  Block Type:interscalene  Injection Technique:single-shot  Needle Type:short-bevel  Needle Gauge:22 G      Medications Used: bupivacaine liposome (EXPAREL) 1.3 % injection - Infiltration   10 mL - 7/2/2025 12:29:00 PM  bupivacaine PF (MARCAINE) 0.25 % injection - Injection   10 mL - 7/2/2025 12:29:00 PM      Medications  Comment:Ultrasound Interpretation:  Using ultrasound guidance the needle was placed in close proximity to the nerve and anesthetic was injected in the area of the nerve and spread of the anesthetic was seen on ultrasound in close proximity thereto.  There were no abnormalities seen on ultrasound; a digital image was taken; and the patient tolerated the procedure with no  complications.        Supraclav w 3 ml 0.25 % bup.    Post Assessment  Injection Assessment: negative aspiration for heme, no paresthesia on injection and incremental injection  Patient Tolerance:comfortable throughout block  Complications:no  Performed by: Kirk Cuevas MD

## 2025-07-02 NOTE — OP NOTE
TOTAL SHOULDER REVERSE ARTHROPLASTY  Procedure Note    Gricel Erwin  7/2/2025    Pre-op Diagnosis:   Periprosthetic fracture right reverse total shoulder arthroplasty    Post-op Diagnosis  Periprosthetic humeral fracture right reverse total shoulder arthroplasty    Procedure:  Revision right reverse total shoulder arthroplasty for periprosthetic fracture    Surgeon: Barber Santamaria M.D.    Surgical assistant: CLAUDIA TURK      Anesthesia: General with Block  Anesthesiologist: Ari Rand MD; Jonathan Allan MD  CRNA: Tammy Hyatt CRNA; Linette Watson CRNA    Staff:   Circulator: Lizett Montero RN  Scrub Person: Chandra Franklin  Vendor Representative: Jose Mcneill  Assistant: Claudia Turk APRN    Indication for Asst.  A surgical assistant, CLAUDIA TURK, was utilized as a medical necessity and was present through the entire procedure allowing safe completion of the procedure as well as reducing overall operative time and morbidity for the patient.  Specifically she assisted in visualization, removal and reimplantation of prosthesis.    IV antibiotic: Cefazolin along with vancomycin powder    Estimated Blood Loss: 200 ml    Specimens:   Order Name Source Comment Collection Info Order Time   TYPE AND SCREEN   Collected By: Sparkle Bahena RN 7/2/2025 11:35 AM     Release to patient   Routine Release            Complications: None     Implant specifics:  Implant Name Type Inv. Item Serial No.  Lot No. LRB No. Used Action   SUT NONABS MAXBRAID NMBR5 K60 38IN WHT/COOPER - [id number] Implant SUT NONABS MAXBRAID NMBR5 K60 38IN WHT/COOPER  [employer] [id number] Right 1 Implanted   SUT NONABS MAXBRAID NMBR5 K60 38IN WHT/COOPER - [id number] Implant SUT NONABS MAXBRAID NMBR5 K60 38IN WHT/COOPER  [employer] [id number] Right 1 Implanted   SUT NONABS MAXBRAID NMBR5 K60 38IN WHT/COOPER - [id number] Implant SUT NONABS MAXBRAID NMBR5 K60 38IN WHT/COOPER  [employer] [id number] Right  1 Implanted   STEM HUM JENY EQUINOXE 6.9B628LD RT - [id number] - [id number] Implant STEM HUM JENY EQUINOXE 6.5J536FW RT [id number] EXACTECH . Right 1 Implanted   CMT BONE PALACOS LVPLSG W/GENT LO/VISC 1X40 - [id number] Implant CMT BONE PALACOS LVPLSG W/GENT LO/VISC 1X40  [facility] [id number] Right 1 Implanted   SCRW EQUINOXE TORQ DEFINE REV SHLDR KT - [id number] - [id number] Implant SCRW EQUINOXE TORQ DEFINE REV SHLDR KT [id number] [employer] . Right 1 Implanted   TRY ADAPT HUM/SHLDR EQUINOXE FOR/REV/TOTL PLS5 - [id number] - [id number] Implant TRY ADAPT HUM/SHLDR EQUINOXE FOR/REV/TOTL PLS5 [id number] [employer] . Right 1 Implanted   LINER HUM/SHLDR EQUINOXEREV/TOTL/SHLDR PE 145DEG 36MM PLS2.5 - [id number] - [id number] Implant LINER HUM/SHLDR EQUINOXEREV/TOTL/SHLDR PE 145DEG 36MM PLS2.5 [id number] [employer] . Right 1 Implanted   CABL FX MGMT ACCORD W CLMP COCR 2MM - [id number] Implant CABL FX MGMT ACCORD W CLMP COCR 2MM  [employer] [id number] Right 2 Implanted   CABL FX MGMT ACCORD W CLMP COCR 2MM - [id number] Implant CABL FX MGMT ACCORD W CLMP COCR 2MM  [employer] [id number] Right 1 Implanted       SURGICAL APPROACH: Deltopectoral           Procedure: The patient was taken to the operative suite.  After adequate anesthesia was established the upper extremity was prepped and draped in the usual fashion.  The head was placed in a neutral position and bony prominences were padded.  Ioban was utilized covering the skin over the shoulder and axilla.  Preoperative antibiotics and transaminase acid were confirmed preoperatively.  An anterior incision was made utilizing the previous incision.  Dissection was carried distally and careful soft tissue dissection was carried out in the deltopectoral interval staying lateral to the coracoid and conjoined tendon.  The capsule was carefully exposed and a capsulotomy was performed exposing the  previously placed components.  Careful dissection was carried out along the proximal humerus and the fracture was exposed.  Soft tissues were reflected and a subperiosteal dissection was carried out proximally to expose the comminuted fracture.  The Preserve stem was loose within the bony shell of the proximal humerus and initially tried to reduce fragments together to see if I could potentially do an open reduction internal fixation however the bone over the greater tuberosity looked to be thinned and I did not feel it would hold screws well enough to get a stable reduction and fixation.  I felt that the only neck step we could do was do a revision of the reverse.  Some of the fracture fragments are simply lifted off the prosthesis and I was able to remove the entire proximal humeral stem, tray and polyethylene is 1 unit.  I then vigorously irrigated and suctioned and expose the fracture fragments and there was a greater tuberosity fracture fragment that I felt needed significant preservation.  3 #5 nonabsorbable sutures were placed through the cuff to secure the bone and reflected out of the way for later repair.  Some the anterior fragments were placed in saline to await later implantation.  The shaft was exposed and it was noted that she had a very small shaft overall and I hand reamed and used a trial stem.  I was only able to ream up to 7 mm and the trial was a 6.5 fracture stem 200 mm in length.  The only option was to cement this into place.  I checked the glenosphere and baseplate proximally and it was all stable and nothing needed to be done to that.  I then trialed and reduced the humeral components to gauge appropriate tension and to gauge the distance I would have to place the fracture stem down for good stable fixation.  After this was satisfactorily identified I isolated the proximal humerus and pulse lavaged and dried the bone.  Bone fragment was utilized distally in the canal for cement restriction.   Cement was mixed with gentamicin and applied and placed into the canal.  This had to be manually pushed down the canal because the cement gun applicator was too large to be put down the canal.  Cement was also placed on the humeral stem as there were canals on both sides and I thought that would help deliver cement into the distal portion of the canal.  I then implanted the stem at a 20 degree retroverted angle and allowed cement to fully harden.  I retrialed and chose a +5 humeral tray which was applied to the torque limiting screw and also through trialing identified that she would need a 2.5 mm polyethylene which was implanted and the shoulder was reduced.  I then passed sutures from the greater tuberosity fragment around the stem proximally and through the holes in the proximal aspect of the stem and secured the greater tuberosity down.  Additional bone from the comminuted fracture was then placedAlong the anterior aspect of the stem and there was a large posterior bony fragment that was reduced into position.  3 cerclage cables were placed around these comminuted bone fragments and secured getting fairly good coverage of the entire proximal stem.  Once complete vigorous irrigation and suction was performed.  Vancomycin powder was utilized in the wound and then a layered closure was performed with 0 Vicryl suture in the deltopectoral interval and distal deep fascia 2-0 Vicryl was used in the subcutaneous tissues.  Staples were used for skin closure.  She was awoken and taken to the postanesthetic recovery unit in good condition.    Barber Santamaria MD     Date: 7/2/2025  Time: 17:39 EDT

## 2025-07-03 VITALS
SYSTOLIC BLOOD PRESSURE: 138 MMHG | HEART RATE: 76 BPM | HEIGHT: 61 IN | TEMPERATURE: 98.1 F | OXYGEN SATURATION: 96 % | WEIGHT: 152 LBS | RESPIRATION RATE: 18 BRPM | BODY MASS INDEX: 28.7 KG/M2 | DIASTOLIC BLOOD PRESSURE: 68 MMHG

## 2025-07-03 LAB
ANION GAP SERPL CALCULATED.3IONS-SCNC: 10 MMOL/L (ref 5–15)
BUN SERPL-MCNC: 18 MG/DL (ref 8–23)
BUN/CREAT SERPL: 15.8 (ref 7–25)
CALCIUM SPEC-SCNC: 8.2 MG/DL (ref 8.6–10.5)
CHLORIDE SERPL-SCNC: 107 MMOL/L (ref 98–107)
CO2 SERPL-SCNC: 21 MMOL/L (ref 22–29)
CREAT SERPL-MCNC: 1.14 MG/DL (ref 0.57–1)
EGFRCR SERPLBLD CKD-EPI 2021: 50.6 ML/MIN/1.73
GLUCOSE SERPL-MCNC: 117 MG/DL (ref 65–99)
HCT VFR BLD AUTO: 25.7 % (ref 34–46.6)
HGB BLD-MCNC: 8.1 G/DL (ref 12–15.9)
POTASSIUM SERPL-SCNC: 4.4 MMOL/L (ref 3.5–5.2)
SODIUM SERPL-SCNC: 138 MMOL/L (ref 136–145)

## 2025-07-03 PROCEDURE — 80048 BASIC METABOLIC PNL TOTAL CA: CPT | Performed by: ORTHOPAEDIC SURGERY

## 2025-07-03 PROCEDURE — 97535 SELF CARE MNGMENT TRAINING: CPT

## 2025-07-03 PROCEDURE — 85018 HEMOGLOBIN: CPT | Performed by: ORTHOPAEDIC SURGERY

## 2025-07-03 PROCEDURE — 85014 HEMATOCRIT: CPT | Performed by: ORTHOPAEDIC SURGERY

## 2025-07-03 PROCEDURE — G0378 HOSPITAL OBSERVATION PER HR: HCPCS

## 2025-07-03 PROCEDURE — 97166 OT EVAL MOD COMPLEX 45 MIN: CPT

## 2025-07-03 PROCEDURE — 25010000002 CEFAZOLIN PER 500 MG: Performed by: ORTHOPAEDIC SURGERY

## 2025-07-03 PROCEDURE — 97530 THERAPEUTIC ACTIVITIES: CPT

## 2025-07-03 RX ORDER — OXYCODONE AND ACETAMINOPHEN 5; 325 MG/1; MG/1
1 TABLET ORAL EVERY 4 HOURS PRN
Qty: 30 TABLET | Refills: 0 | Status: SHIPPED | OUTPATIENT
Start: 2025-07-03

## 2025-07-03 RX ADMIN — OXYCODONE AND ACETAMINOPHEN 2 TABLET: 5; 325 TABLET ORAL at 09:13

## 2025-07-03 RX ADMIN — AMLODIPINE BESYLATE 5 MG: 5 TABLET ORAL at 09:12

## 2025-07-03 RX ADMIN — CEFAZOLIN 2000 MG: 2 INJECTION, POWDER, FOR SOLUTION INTRAMUSCULAR; INTRAVENOUS at 05:19

## 2025-07-03 RX ADMIN — ASPIRIN 81 MG: 81 TABLET, COATED ORAL at 09:10

## 2025-07-03 RX ADMIN — PANTOPRAZOLE SODIUM 40 MG: 40 TABLET, DELAYED RELEASE ORAL at 05:19

## 2025-07-03 RX ADMIN — OXYCODONE AND ACETAMINOPHEN 2 TABLET: 5; 325 TABLET ORAL at 04:05

## 2025-07-03 NOTE — PLAN OF CARE
Goal Outcome Evaluation:  Plan of Care Reviewed With: patient        Progress: improving  Outcome Evaluation: vss. nvi. dressing CDI chnaged to optifoam this AM. x1 hand held assist BRP. pain managed with PO meds. plan to d/c today if medically stable.

## 2025-07-03 NOTE — CASE MANAGEMENT/SOCIAL WORK
Case Management Discharge Note    Final Note: Pt DC home prior to [facility] completing screen; no needs identified. [facility]     Selected Continued Care - Discharged on 7/3/2025 Admission date: 7/2/2025 - Discharge disposition: Home or Self Care     Transportation Services  Transportation: Private Transportation  Private: Car    Final Discharge Disposition Code: 01 - home or self-care

## 2025-07-03 NOTE — THERAPY EVALUATION
Patient Name: Gricel Erwin  : 1950    MRN: [id number]                              Today's Date: 7/3/2025       Admit Date: 2025    Visit Dx:     ICD-10-CM ICD-9-CM   1. S/P reverse total shoulder arthroplasty, right  Z96.611 V43.61     Patient Active Problem List   Diagnosis    Lump or mass in breast    Abnormal mammogram    Abnormal ultrasound of breast    FH: ovarian cancer    Diffuse cystic mastopathy    Humerus fracture    S/P reverse total shoulder arthroplasty, right     Past Medical History:   Diagnosis Date    Allergies     Anxiety     Arthritis     Depression     GERD (gastroesophageal reflux disease)     History of COVID-19     History of pancreatitis     Hyperlipidemia     Hypertension     IBS (irritable bowel syndrome)     Insomnia     Left shoulder pain     Sleep apnea     WEARS MOUTH GUARD    Slow to wake up after anesthesia      Past Surgical History:   Procedure Laterality Date    ABDOMINOPLASTY      APPENDECTOMY      BREAST BIOPSY      CATARACT EXTRACTION Bilateral      SECTION       AND     CHOLECYSTECTOMY      CO2 LASER OF THE FACE  2014    LASER ON FACE FOR PRE-CANCER SPOTS     COLONOSCOPY      ELBOW PROCEDURE Left      AND  BROKEN  ELBOW AND SURGERY    ENDOSCOPY      FACIAL COSMETIC SURGERY      NECK AND FACE LIFT     HYSTERECTOMY      JOINT REPLACEMENT      LIPOSUCTION      WITH TUMMY TUCK     REVISION OF SCAR  1988    THROAT SURGERY      LASER THROAT SURGERY    TOTAL KNEE ARTHROPLASTY Bilateral 2013    TOTAL SHOULDER ARTHROPLASTY W/ DISTAL CLAVICLE EXCISION Left 2025    Procedure: LEFT TOTAL SHOULDER REVERSE ARTHROPLASTY;  Surgeon: Barber Santamaria MD;  Location: [facility];  Service: Orthopedics;  Laterality: Left;    TOTAL SHOULDER ARTHROPLASTY W/ DISTAL CLAVICLE EXCISION Right 2025    Procedure: REVISION FOR FRACTURE RIGHT REVERSE TOTAL SHOULDER;  Surgeon: Barber Santamaria MD;  Location: [facility];   Service: Orthopedics;  Laterality: Right;    TOTAL SHOULDER REVERSE ARTHROPLASTY Right     TUBAL ABDOMINAL LIGATION  1981      General Information       Row Name 07/03/25 1202          OT Time and Intention    Document Type evaluation;discharge evaluation/summary  -KR     Mode of Treatment individual therapy;occupational therapy  -KR     Patient Effort good  -KR       Row Name 07/03/25 1202          General Information    Patient Profile Reviewed yes  -KR     Existing Precautions/Restrictions shoulder;right;non-weight bearing  -KR     Barriers to Rehab none identified  -KR       Row Name 07/03/25 1202          Living Environment    Current Living Arrangements home  -KR     People in Home spouse  -KR       Row Name 07/03/25 1202          Cognition    Orientation Status (Cognition) oriented x 4  -KR       Row Name 07/03/25 1202          Safety Issues/Impairments Affecting Functional Mobility    Impairments Affecting Function (Mobility) range of motion (ROM);endurance/activity tolerance;strength  -KR     Comment, Safety Issues/Impairments (Mobility) Gait belt and non-skid socks donned.  -KR               User Key  (r) = Recorded By, (t) = Taken By, (c) = Cosigned By      Initials Name Provider Type    KR Jeffery Jon OT Occupational Therapist                     Mobility/ADL's       Row Name 07/03/25 1205          Bed Mobility    Bed Mobility supine-sit  -KR     Supine-Sit [place] (Bed Mobility) contact guard;verbal cues  -KR     Bed Mobility, Safety Issues decreased use of arms for pushing/pulling  -KR       Row Name 07/03/25 1205          Transfers    Transfers sit-stand transfer;stand-sit transfer  -KR       Row Name 07/03/25 1205          Sit-Stand Transfer    Sit-Stand [place] (Transfers) verbal cues;standby assist  -KR       Row Name 07/03/25 1205          Stand-Sit Transfer    Stand-Sit [place] (Transfers) verbal cues;standby assist  -KR       Row Name 07/03/25 1205          Functional Mobility     Functional Mobility- Ind. Level contact guard assist;verbal cues required;standby assist  -KR     Functional Mobility- Comment pt completed household/community distances with CGA and progressed to SBA/SPV. Pt with one seated rest break on long community distance.  -KR     Patient was able to Ambulate yes  -KR       Row Name 07/03/25 1205          Activities of Daily Living    BADL Assessment/Intervention upper body dressing;lower body dressing;grooming  -KR       Row Name 07/03/25 1205          Mobility    Extremity Weight-bearing Status right upper extremity  -KR     Right Upper Extremity (Weight-bearing Status) non weight-bearing (NWB)  -KR       Row Name 07/03/25 1205          Lower Body Dressing Assessment/Training    [place] Level (Lower Body Dressing) minimum assist (75% patient effort);verbal cues  -KR     Position (Lower Body Dressing) edge of bed sitting  -KR     Comment, (Lower Body Dressing) cues for safety and technique  -KR       Row Name 07/03/25 1205          Upper Body Dressing Assessment/Training    [place] Level (Upper Body Dressing) upper body dressing skills;don;front opening garment;contact guard assist;minimum assist (75% patient effort)  -KR     Position (Upper Body Dressing) edge of bed sitting  -KR     Comment, (Upper Body Dressing) pt instructed on pricilla-dressing technique  -KR       Row Name 07/03/25 1205          Grooming Assessment/Training    [place] Level (Grooming) supervision;oral care regimen  -KR               User Key  (r) = Recorded By, (t) = Taken By, (c) = Cosigned By      Initials Name Provider Type    Jeffery Webber OT Occupational Therapist                   Obj/Interventions       Row Name 07/03/25 1232          Sensory Assessment (Somatosensory)    Sensory Assessment pt reported that her senstion has returned  -KR       Row Name 07/03/25 1232          Vision Assessment/Intervention    Visual Impairment/Limitations WNL  -KR       Row Name 07/03/25 1232           Range of Motion Comprehensive    Comment, General Range of Motion WFL except for affected limb limited 2/2 surgery  -       Row Name 07/03/25 1232          Strength Comprehensive (MMT)    General Manual Muscle Testing (MMT) Assessment upper extremity strength deficits identified  -     Comment, General Manual Muscle Testing (MMT) Assessment LUE with previous break from childhood and limited elbow extension. Pt's R limb limited 2/2 surgery  -       Row Name 07/03/25 1232          Shoulder (Therapeutic Exercise)    Shoulder (Therapeutic Exercise) pendulum exercises;other (see comments)  -KR     Shoulder Pendulum Exercises (Therapeutic Exercise) standing;30 seconds duration;needs assist to initiate movement  -       Row Name 07/03/25 1232          Elbow/Forearm (Therapeutic Exercise)    Elbow/Forearm (Therapeutic Exercise) AROM (active range of motion)  -KR     Elbow/Forearm AROM (Therapeutic Exercise) 10 repetitions;right;flexion;extension;standing;supination;pronation  -       Row Name 07/03/25 1232          Wrist (Therapeutic Exercise)    Wrist (Therapeutic Exercise) AROM (active range of motion)  -KR     Wrist AROM (Therapeutic Exercise) right;flexion;extension;10 repetitions  -       Row Name 07/03/25 1232          Motor Skills    Therapeutic Exercise shoulder;elbow/forearm;wrist  -       Row Name 07/03/25 1232          Balance    Balance Assessment sitting static balance;sitting dynamic balance;standing static balance;standing dynamic balance  -KR     Static Sitting Balance supervision  -KR     Dynamic Sitting Balance contact guard;supervision  -KR     Static Standing Balance supervision  -KR     Dynamic Standing Balance contact guard  -KR               User Key  (r) = Recorded By, (t) = Taken By, (c) = Cosigned By      Initials Name Provider Type    Jeffery Webber OT Occupational Therapist                   Goals/Plan       Row Name 07/03/25 2334          Problem Specific Goal 1 (OT)     Problem Specific Goal 1 (OT) Pt to verbalize understanding of HEP, shoulder precautions, donning/doffing sling, and ADL modification prior to DC.  -KR     Time Frame (Problem Specific Goal 1, OT) 2 weeks;short term goal (STG)  -KR     Progress/Outcome (Problem Specific Goal 1, OT) goal met  -KR               User Key  (r) = Recorded By, (t) = Taken By, (c) = Cosigned By      Initials Name Provider Type    KR Jeffery Jon, ATUL Occupational Therapist                   Clinical Impression       Row Name 07/03/25 1243          Pain Assessment    Pretreatment Pain Rating 8/10  -KR     Posttreatment Pain Rating 9/10  -KR     Pain Location elbow;shoulder  -KR     Pain Side/Orientation right  -KR     Pain Management Interventions activity modification encouraged;exercise or physical activity utilized;movement retraining implemented;nursing notified  -KR     Pre/Posttreatment Pain Comment RN informed of pain and in room end of session for pain medication.  -KR       Row Name 07/03/25 1243          Plan of Care Review    Plan of Care Reviewed With patient  -KR     Outcome Evaluation Pt is a 75yof POD#1 revision right reverse total shoulder arthroplasty for periprosthetic fracture with old HW removal. Pt instructed on dressing technique, HEP, precautions, donning/doffing sling, and ice scheduling. Pt with inc pain in RUE, RN informed and addressing end of session. Min A for upper body/lower body dressing and for donning/doffing sling. Pt completed household distances with SBA-CGA. Pt was able to meet all goals and is able to d/c home with family. Pt to follow up with MD and OP therapy services once cleared.  -KR       Row Name 07/03/25 1242          Therapy Assessment/Plan (OT)    Criteria for Skilled Therapeutic Interventions Met (OT) no;no problems identified which require skilled intervention  -KR     Therapy Frequency (OT) evaluation only  -KR       Row Name 07/03/25 1244          Therapy Plan Review/Discharge Plan (OT)     Anticipated Discharge Disposition (OT) home with assist;home with outpatient therapy services  -KR       Row Name 07/03/25 1243          Positioning and Restraints    Pre-Treatment Position in bed  -KR     Post Treatment Position other  -KR     Other Position with nsg  -KR               User Key  (r) = Recorded By, (t) = Taken By, (c) = Cosigned By      Initials Name Provider Type    Jeffery Webber OT Occupational Therapist                   Outcome Measures       Row Name 07/03/25 1403          How much help from another is currently needed...    Putting on and taking off regular lower body clothing? 3  -KR     Bathing (including washing, rinsing, and drying) 3  -KR     Toileting (which includes using toilet bed pan or urinal) 3  -KR     Putting on and taking off regular upper body clothing 3  -KR     Taking care of personal grooming (such as brushing teeth) 3  -KR     Eating meals 4  -KR     AM-PAC 6 Clicks Score (OT) 19  -KR       Row Name 07/03/25 0902          How much help from another person do you currently need...    Turning from your back to your side while in flat bed without using bedrails? 4  -CG     Moving from lying on back to sitting on the side of a flat bed without bedrails? 4  -CG     Moving to and from a bed to a chair (including a wheelchair)? 4  -CG     Standing up from a chair using your arms (e.g., wheelchair, bedside chair)? 4  -CG     Climbing 3-5 steps with a railing? 4  -CG     To walk in hospital room? 4  -CG     AM-PAC 6 Clicks Score (PT) 24  -CG       Row Name 07/03/25 1403          Functional Assessment    Outcome Measure Options AM-PAC 6 Clicks Daily Activity (OT)  -KR               User Key  (r) = Recorded By, (t) = Taken By, (c) = Cosigned By      Initials Name Provider Type    Yuri Marmolejo RN Registered Nurse    Jeffery Webber OT Occupational Therapist                    Occupational Therapy Education       Title: PT OT SLP Therapies (Done)       Topic:  Occupational Therapy (Done)       Point: ADL training (Done)       Learning Progress Summary            Patient Acceptance, E,TB, VU by KR at 7/3/2025 1403    Comment: instructed on role of OT, dressing technique, HEP, precautions, donning/doffing sling, and ice scheduling                      Point: Home exercise program (Done)       Learning Progress Summary            Patient Acceptance, E,TB, VU by KR at 7/3/2025 1403    Comment: instructed on role of OT, dressing technique, HEP, precautions, donning/doffing sling, and ice scheduling                      Point: Precautions (Done)       Learning Progress Summary            Patient Acceptance, E,TB, VU by KR at 7/3/2025 1403    Comment: instructed on role of OT, dressing technique, HEP, precautions, donning/doffing sling, and ice scheduling                      Point: Body mechanics (Done)       Learning Progress Summary            Patient Acceptance, E,TB, VU by KR at 7/3/2025 1403    Comment: instructed on role of OT, dressing technique, HEP, precautions, donning/doffing sling, and ice scheduling                                      User Key       Initials Effective Dates Name Provider Type Discipline    DEVIN 04/01/25 -  Jeffery Jon OT Occupational Therapist OT                  OT Recommendation and Plan  Therapy Frequency (OT): evaluation only  Plan of Care Review  Plan of Care Reviewed With: patient  Outcome Evaluation: Pt is a 75yof POD#1 revision right reverse total shoulder arthroplasty for periprosthetic fracture with old HW removal. Pt instructed on dressing technique, HEP, precautions, donning/doffing sling, and ice scheduling. Pt with inc pain in RUE, RN informed and addressing end of session. Min A for upper body/lower body dressing and for donning/doffing sling. Pt completed household distances with SBA-CGA. Pt was able to meet all goals and is able to d/c home with family. Pt to follow up with MD and OP therapy services once cleared.     Time  Calculation:   Evaluation Complexity (OT)  Review Occupational Profile/Medical/Therapy History Complexity: brief/low complexity  Assessment, Occupational Performance/Identification of Deficit Complexity: 1-3 performance deficits  Clinical Decision Making Complexity (OT): problem focused assessment/low complexity  Overall Complexity of Evaluation (OT): low complexity     Time Calculation- OT       Row Name 07/03/25 1406             Time Calculation- OT    OT Start Time 0814  -KR      OT Stop Time 0907  -KR      OT Time Calculation (min) 53 min  -KR      Total Timed Code Minutes- OT 38 minute(s)  -KR      OT Received On 07/03/25  -KR         Timed Charges    32086 - OT Therapeutic Activity Minutes 26  -KR      51732 - OT Self Care/Mgmt Minutes 12  -KR         Total Minutes    Timed Charges Total Minutes 38  -KR       Total Minutes 38  -KR                User Key  (r) = Recorded By, (t) = Taken By, (c) = Cosigned By      Initials Name Provider Type    KR Jeffery Jon OT Occupational Therapist                  Therapy Charges for Today       Code Description Service Date Service Provider Modifiers Qty    41002629579 HC OT THERAPEUTIC ACT EA 15 MIN 7/3/2025 Jeffery Jon OT GO 2    60595134819 HC OT SELF CARE/MGMT/TRAIN EA 15 MIN 7/3/2025 Jeffery Jon OT GO 1    49340404659 HC OT EVAL MOD COMPLEXITY 2 7/3/2025 Jeffery Jon OT GO 1                 Jeffery Jon OT  7/3/2025

## 2025-07-03 NOTE — DISCHARGE INSTRUCTIONS
Dr. Barber Santamaria  [address] [address]  [phone]    Outpatient  TOTAL SHOULDER REPLACEMENT  HOSPITAL DISCHARGE INSTRUCTIONS     GENERAL INFORMATION: With improved surgical techniques for total shoulder and reverse total shoulder replacement and the Restorationist of ultrasound guided long acting nerve blocks, the hospital stay for patients has decreased significantly.  Many people can go home right after surgery as an outpatient.    SPECIFIC POST-OP INSTRUCTIONS:  * FOLLOW UP APPOINTMENT: You should have been given an appointment to follow up 10-14 days after your date of surgery. We can see you back sooner if there are any problems or concerns.   * BLOOD THINNERS: All patients will be on some type of blood thinner post-op to prevent blood clot. Most patients who are not already on a blood thinner are discharged on over-the-counter aspirin 81 mg or 325mg daily which you will take for three weeks. If you were taking a blood thinner prior to surgery, we will have you resume this on the first day post-op.   * ICE: Ice helps to decrease both pain and swelling. Ice should be applied to the shoulder 20-25 minutes each hour while awake.   *DRESSING CHANGES: We ask that you keep the incision clean and dry.  Your surgical dressing can be removed 48 hours after surgery.  There will be a yellow strip of gauze and a Zipline device that will be underneath the dressing.  The yellow gauze can be removed but leave the Zipline alone.  You can then apply a watertight dressing over the Zipline to protect it.  You can get this type of dressing from the hospital before you leave or at your local pharmacy.   *SHOWERING: The wound edges typically come together and seal by 3-5 days post-op if there is no drainage. Again, please keep the same waterproof dressing on. DO NOT SUBMERSE SHOULDER IN POOL,HOT TUB OR BATH UNTIL AT LEAST 3-4 WEEKS POST-OP (EVEN WITH WATERPROOF BANDAIDS).  * PAIN  MEDICINE: You will be given a prescription for  oral pain medication prior to discharge from the hospital. Please let us know if you have a sensitivity to certain pain medications prior to discharge. Additional pain medication prescriptions can be called into your pharmacy during normal business hours. NO medications can be called in over the weekends or after business hours.   * ORAL ANTI-INFLAMMATORIES:   You will be asked to discontinue ALL oral anti-inflammatories prior to surgery. You can resume these the first day post-op.These can be combined with the oral pain medications safely. DO NOT TAKE ADDITIONAL TYLENOL WITH THE NARCOTIC PAIN MEDICATION (it already has Tylenol in it). If you were not taking an anti-inflammatory pre-op, you can start one on the first day post-op. It will help decrease pain and swelling. Typical medications and dosages are as follows:   Advil/Motrin/Ibuprofen 200mg, 4 pills every 8 hours   Aleve/Naproxen Sodium 220mg, 2 pills every 12 hours  * SLING: We recommend you wear the sling at all times, other than when showering or doing physical therapy exercises.  *For total shoulder patient's I recommend you do not rotate your arm away from your body into external rotation to protect the front of the shoulder.  If you imagine a box in front of your waist we want to keep your hand inside of the box and not outside of the box.  * WEIGHT BEARING: We ask that you be non-weight bearing on the operative shoulder. Do not use the operative arm to lift/push/pull greater than 5lbs.   * PHYSICAL THERAPY: Before you leave the hospital staff will teach you some very gentle range of motion exercises to do at home for the first 10-14 days.  You will get a prescription for formal physical therapy after the surgery.  This can be done downstairs at [facility] or at a location of your choice. Physical therapy is typically 2-3 times a week for 4-6 weeks, depending on the individual and their progress.   * DRIVING A CAR: Medically/legally we can not recommend you  "drive a car while in the sling or while on pain medication (roughly 10-14 days).   * RETURNING TO DAILY AND RECREATIONAL ACTIVITIES: For the most part patients can progress to daily activities as tolerated (keeping in mind the restrictions listed above). Initially, we do not want you to \"overdo\" it in an attempt to minimize post-op pain and swelling. Once the swelling is controlled, you can progress with activities as tolerated. Pain and swelling should be your guide to increasing your activity level.   * RETURN TO WORK: The return to work date depends on many factors and is very dependent on the individual. You would most likely be able to return to a sedentary job after your first post-op visit (10-14 days after surgery). A more physical job would obviously require a longer recovery time before return to work.  "

## 2025-07-03 NOTE — PROGRESS NOTES
Orthopedic Progress Note    Subjective     Post-Operative Day: 1 post-right revision reverse total shoulder for periprosthetic fracture  Systemic or Specific Complaints: No Complaints. Pain is under control with PO meds.     Objective     Vital signs in last 24 hours:  Temp:  [97.5 °F (36.4 °C)-98.8 °F (37.1 °C)] 98.1 °F (36.7 °C)  Heart Rate:  [70-83] 76  Resp:  [16-20] 18  BP: (122-151)/(55-79) 138/68    General: alert, appears stated age and cooperative   Neurovascular: Able to extend wrist and fingers and has normal distal sensation  Radial pulse: 2+.   Wound: Dressing clean and dry.    Range of Motion: Limited ROM Post op     Data Review  CBC:  Results from last 7 days   Lab Units 07/03/25  0527 06/27/25  0553   WBC 10*3/mm3  --  10.80   RBC 10*6/mm3  --  4.03   HEMOGLOBIN g/dL 8.1* 10.0*   HEMATOCRIT % 25.7* 34.5   PLATELETS 10*3/mm3  --  381       Assessment & Plan     IMPRESSION:stable status post revision right reverse total shoulder arthroplasty for fracture  PLAN: D/C home this morning change dressing to a light watertight bandage. Pendulum swings and elbow and wrist range of motion initiated.  Follow up in office 10-14 days (already scheduled).  Physical therapy is scheduled.    Activity: As directed by physical therapy.  Sling for protection.  Pendulum swings, elbow and wrist range of motion.  Ice for swelling     LOS: 0 days     Barber Santamaria MD    Date: 7/3/2025  Time: 07:38 EDT     Patient called very upset and crying  Patient has been vomiting thick, dark phlegm  She has been doing the Hstry  Last time she vomited was this morning  She has been to the ER 2x in the past week  She can not get an appointment with GI until 6/18, is there anyway we can call and get that moved up? What should she do in the mean time? She has missed a lot of work and her job is now requiring her to get FMLA forms filled out

## 2025-07-03 NOTE — PLAN OF CARE
Goal Outcome Evaluation:  Plan of Care Reviewed With: patient        Progress: improving  Outcome Evaluation: Patient arrived from [facility] in stable condition after undergoing R reverse shoulder revision. Interscalene block in effect and patient with minimal pain at the moment. Patient able to ambulate with gait belt and hand held assist. Patient educated on bp monitoring and med management as well as IS use in postop setting.  at bedside. Plan for d/c home when stable.

## 2025-07-03 NOTE — PLAN OF CARE
Goal Outcome Evaluation:  Plan of Care Reviewed With: patient           Outcome Evaluation: Pt is a 75yof POD#1 revision right reverse total shoulder arthroplasty for periprosthetic fracture with old HW removal. Pt instructed on dressing technique, HEP, precautions, donning/doffing sling, and ice scheduling. Pt with inc pain in ADOLPHE, RN informed and addressing end of session. Min A for upper body/lower body dressing and for donning/doffing sling. Pt completed household distances with SBA-CGA. Pt was able to meet all goals and is able to d/c home with family. Pt to follow up with MD and OP therapy services once cleared.    Anticipated Discharge Disposition (OT): home with assist, home with outpatient therapy services

## 2025-07-03 NOTE — DISCHARGE SUMMARY
Orthopedic Discharge Summary      Patient: Gricel Erwin      YOB: 1950    Medical Record Number: [id number]    Attending Physician: Barber Santamaria MD  Consulting Physician(s):   Date of Admission: 7/2/2025 10:56 AM  Date of Discharge:     Status post revision right reverse total shoulder arthroplasty for periprosthetic fracture    Status Post: ID ARTHROPLASTY GLENOHUMERAL JOINT TOTAL SHOULDER [[id number]] (REVISION RIGHT REVERSE TOTAL SHOULDER VERSUS OPEN REDUCTION INTERNAL FIXATION RIGHT SHOULDER)    Allergies   Allergen Reactions    Kiwi Extract Anaphylaxis    Aleve [Naproxen Sodium] Hives     ALL NSAIDS - IN LARGE DOSES      Celebrex [Celecoxib] Hives    Cymbalta [Duloxetine Hcl] Hives    Macrobid [Nitrofurantoin Monohyd Macro] Hives    Penicillins Hives    Sulfa Antibiotics Hives       Current Medications:     Discharge Medications        New Medications        Instructions Start Date   naloxone 4 MG/0.1ML nasal spray  Commonly known as: NARCAN   Call 911. Don't prime. [place] in 1 nostril for overdose. Repeat in 2-3 minutes in other nostril if no or minimal breathing/responsiveness.             Changes to Medications        Instructions Start Date   oxyCODONE-acetaminophen 5-325 MG per tablet  Commonly known as: PERCOCET  What changed:   how much to take  when to take this  reasons to take this   1 tablet, Oral, Every 4 Hours PRN             Continue These Medications        Instructions Start Date   albuterol sulfate  (90 Base) MCG/ACT inhaler  Commonly known as: PROVENTIL HFA;VENTOLIN HFA;PROAIR HFA   2 puffs, Every 4 Hours PRN      amLODIPine 5 MG tablet  Commonly known as: NORVASC   5 mg, 2 Times Daily      buPROPion  MG 24 hr tablet  Commonly known as: WELLBUTRIN XL   300 mg, Nightly      CALCIUM + D PO   1 tablet, Daily      coenzyme Q10 50 MG capsule capsule   50 mg, Daily      dicyclomine 10 MG capsule  Commonly known as: BENTYL   10 mg, 4 Times Daily Before Meals & Nightly       esomeprazole 40 MG capsule  Commonly known as: nexIUM   40 mg, Nightly      estradiol 0.5 MG tablet  Commonly known as: ESTRACE   0.5 mg, Daily      fexofenadine 180 MG tablet  Commonly known as: ALLEGRA   180 mg, Nightly      FLUoxetine 40 MG capsule  Commonly known as: PROzac   40 mg, Nightly      FLUoxetine 20 MG capsule  Commonly known as: PROzac   20 mg, Nightly      magnesium oxide 400 (241.3 Mg) MG tablet tablet  Commonly known as: MAGOX   400 mg, Daily      montelukast 10 MG tablet  Commonly known as: SINGULAIR   10 mg, Nightly      ondansetron 4 MG tablet  Commonly known as: Zofran   4 mg, Oral, Every 8 Hours PRN      traZODone 50 MG tablet  Commonly known as: DESYREL   50 mg, Nightly      triamterene 50 MG capsule  Commonly known as: DYRENIUM   50 mg, Nightly               Past Medical History:   Diagnosis Date    Allergies     Anxiety     Arthritis     Depression     GERD (gastroesophageal reflux disease)     History of COVID-2020    History of pancreatitis     Hyperlipidemia     Hypertension     IBS (irritable bowel syndrome)     Insomnia     Left shoulder pain     Sleep apnea     WEARS MOUTH GUARD    Slow to wake up after anesthesia      Past Surgical History:   Procedure Laterality Date    ABDOMINOPLASTY      APPENDECTOMY      BREAST BIOPSY      CATARACT EXTRACTION Bilateral      SECTION       AND     CHOLECYSTECTOMY      CO2 LASER OF THE FACE  2014    LASER ON FACE FOR PRE-CANCER SPOTS     COLONOSCOPY      ELBOW PROCEDURE Left      AND  BROKEN  ELBOW AND SURGERY    ENDOSCOPY      FACIAL COSMETIC SURGERY      NECK AND FACE LIFT     HYSTERECTOMY      JOINT REPLACEMENT      LIPOSUCTION      WITH DEONDRE JI     REVISION OF SCAR  1988    THROAT SURGERY      LASER THROAT SURGERY    TOTAL KNEE ARTHROPLASTY Bilateral 2013    TOTAL SHOULDER ARTHROPLASTY W/ DISTAL CLAVICLE EXCISION Left 2025    Procedure: LEFT TOTAL SHOULDER REVERSE ARTHROPLASTY;   Surgeon: Barber Santamaria MD;  Location: [facility] [place] [facility] [place];  Service: Orthopedics;  Laterality: Left;    TOTAL SHOULDER REVERSE ARTHROPLASTY Right     TUBAL ABDOMINAL LIGATION  1981     Social History     Occupational History    Not on file   Tobacco Use    Smoking status: Never    Smokeless tobacco: Never   Vaping Use    Vaping status: Never Used   Substance and Sexual Activity    Alcohol use: No    Drug use: No    Sexual activity: Defer      Social History     Social History Narrative    Not on file     Family History   Problem Relation Age of Onset    Hypertension Mother     Depression Father     Diabetes Father     Heart attack Father     Hyperlipidemia Father     Hypertension Father     Depression Sister     Heart attack Sister     Hypertension Sister     Cancer Sister         UNCERTAIN SITE-POSSIBLE SALIVARY GLAND     Depression Brother     Hypertension Brother     Stomach cancer Maternal Grandmother     Prostate cancer Maternal Grandfather     Ovarian cancer Cousin 25    Malig Hyperthermia Neg Hx            Hospital Course:  75 y.o. female admitted to [facility] to services of Barber Santamaria MD with periprosthetic fracture around the right reverse total shoulder previously placed.  Patient was admitted for revision of right reverse total shoulder arthroplasty antibiotic and VTE prophylaxis were per [facility] protocols. Post-operatively the patient transferred to the post-operative floor where the patient underwent mobilization therapy that included active as well as passive ROM exercises. Opioids were titrated to achieve appropriate pain management to allow for participation in mobilization exercises. Vital signs are now stable.  The dressing is clean and dry.  There are no obvious postoperative abnormalities involving the operative extremity.   Appropriate education re: incision care, activity levels, medications, and follow up visits was completed and all questions were answered. The patient is now deemed  stable for discharge to Home.    Discharge and Follow up Instructions: Discharge home today.  Follow-up in 10 to 14 days.  Start home exercise program and physical therapy when directed.    Date: 7/3/2025    Barber Santamaria MD    Time:

## 2025-07-09 NOTE — H&P
History & Physical     Patient: Gricel Erwin    Date of Admission: 7/2/2025 10:56 AM    YOB: 1950    Medical Record Number: [id number]    Attending Physician: No att. providers found    Chief Complaints: Periprosthetic fracture S/P reverse total shoulder arthroplasty, right [Z96.611]    History of Present Illness: 75 y.o. female presents with a fall on her right shoulder and sustained a proximal humeral fracture periprosthetic S/P reverse total shoulder arthroplasty, right [Z96.611].  She was treated in the sling and allowed swelling to improve before coming in today for revision right reverse total shoulder for periprosthetic fracture.    Allergies:   Allergies   Allergen Reactions    Kiwi Extract Anaphylaxis    Aleve [Naproxen Sodium] Hives     ALL NSAIDS - IN LARGE DOSES      Celebrex [Celecoxib] Hives    Cymbalta [Duloxetine Hcl] Hives    Macrobid [Nitrofurantoin Monohyd Macro] Hives    Penicillins Hives    Sulfa Antibiotics Hives       Medications:   Home Medications:  No current facility-administered medications on file prior to encounter.     Current Outpatient Medications on File Prior to Encounter   Medication Sig    amLODIPine (NORVASC) 5 MG tablet Take 1 tablet by mouth 2 (Two) Times a Day.    buPROPion XL (WELLBUTRIN XL) 300 MG 24 hr tablet Take 1 tablet by mouth Every Night.    Calcium Carbonate-Vitamin D (CALCIUM + D PO) Take 1 tablet by mouth Daily.    coenzyme Q10 50 MG capsule capsule Take 1 capsule by mouth Daily.    dicyclomine (BENTYL) 10 MG capsule Take 1 capsule by mouth 4 (Four) Times a Day Before Meals & at Bedtime.    esomeprazole (NexIUM) 40 MG capsule Take 1 capsule by mouth Every Night.    estradiol (ESTRACE) 0.5 MG tablet Take 1 tablet by mouth Daily.    fexofenadine (ALLEGRA) 180 MG tablet Take 1 tablet by mouth Every Night.    FLUoxetine (PROzac) 20 MG capsule Take 1 capsule by mouth Every Night. In addition to a 40mg for a total dose of 20mg nightly    FLUoxetine  (PROzac) 40 MG capsule Take 1 capsule by mouth Every Night. In addition to a 20mg for a total dose of 40mg nightly    magnesium oxide (MAGOX) 400 (241.3 MG) MG tablet tablet Take 1 tablet by mouth Daily.    montelukast (SINGULAIR) 10 MG tablet Take 1 tablet by mouth Every Night.    traZODone (DESYREL) 50 MG tablet Take 1 tablet by mouth Every Night.    triamterene (DYRENIUM) 50 MG capsule Take 1 capsule by mouth Every Night.    ondansetron (Zofran) 4 MG tablet Take 1 tablet by mouth Every 8 (Eight) Hours As Needed for Nausea or Vomiting.     Current Medications:  Scheduled Meds:  Continuous Infusions:No current facility-administered medications for this encounter.    PRN Meds:.    Past Medical History:   Diagnosis Date    Allergies     Anxiety     Arthritis     Depression     GERD (gastroesophageal reflux disease)     History of COVID-19     History of pancreatitis     Hyperlipidemia     Hypertension     IBS (irritable bowel syndrome)     Insomnia     Left shoulder pain     Sleep apnea     WEARS MOUTH GUARD    Slow to wake up after anesthesia      Past Surgical History:   Procedure Laterality Date    ABDOMINOPLASTY      APPENDECTOMY      BREAST BIOPSY      CATARACT EXTRACTION Bilateral      SECTION       AND     CHOLECYSTECTOMY      CO2 LASER OF THE FACE  2014    LASER ON FACE FOR PRE-CANCER SPOTS     COLONOSCOPY      ELBOW PROCEDURE Left      AND  BROKEN  ELBOW AND SURGERY    ENDOSCOPY      FACIAL COSMETIC SURGERY      NECK AND FACE LIFT     HYSTERECTOMY      JOINT REPLACEMENT      LIPOSUCTION      WITH DEONDRE JI     REVISION OF SCAR  1988    THROAT SURGERY      LASER THROAT SURGERY    TOTAL KNEE ARTHROPLASTY Bilateral 2013    TOTAL SHOULDER ARTHROPLASTY W/ DISTAL CLAVICLE EXCISION Left 2025    Procedure: LEFT TOTAL SHOULDER REVERSE ARTHROPLASTY;  Surgeon: Barber Santamaria MD;  Location: [facility] OR [facility] [place];  Service: Orthopedics;  Laterality: Left;    TOTAL  SHOULDER ARTHROPLASTY W/ DISTAL CLAVICLE EXCISION Right 7/2/2025    Procedure: REVISION FOR FRACTURE RIGHT REVERSE TOTAL SHOULDER;  Surgeon: Barber Santamaria MD;  Location: [facility] OR [facility] [place];  Service: Orthopedics;  Laterality: Right;    TOTAL SHOULDER REVERSE ARTHROPLASTY Right     TUBAL ABDOMINAL LIGATION  1981     Social History     Occupational History    Not on file   Tobacco Use    Smoking status: Never    Smokeless tobacco: Never   Vaping Use    Vaping status: Never Used   Substance and Sexual Activity    Alcohol use: No    Drug use: No    Sexual activity: Defer      Social History     Social History Narrative    Not on file     Family History   Problem Relation Age of Onset    Hypertension Mother     Depression Father     Diabetes Father     Heart attack Father     Hyperlipidemia Father     Hypertension Father     Depression Sister     Heart attack Sister     Hypertension Sister     Cancer Sister         UNCERTAIN SITE-POSSIBLE SALIVARY GLAND     Depression Brother     Hypertension Brother     Stomach cancer Maternal Grandmother     Prostate cancer Maternal Grandfather     Ovarian cancer Cousin 25    Malig Hyperthermia Neg Hx        Review of Systems:   HEENT: Patient denies any headaches, vision changes, change in hearing, or tinnitus, Patient denies any rhinorrhea,epistaxis, sinus pain, mouth or dental problems, sore throat or hoarseness, or dysphagia  Pulmonary: Patient denies any cough, congestion, SOA, or wheezing  Cardiovascular: Patient denies any chest pain, dyspnea, palpitations, weakness, intolerance of exercise, varicosities, swelling of extremities, known murmur  Gastrointestinal:  Patient denies nausea, vomiting, diarrhea, constipation, loss  of appetite, change in appetite, dysphagia, gas, heartburn, melena, change in bowel habits, use of laxatives or other drugs to alter the function of the gastrointestinal tract.  Genital/Urinary: Patient denies dysuria, change in color of urine, change in  frequency of urination, pain with urgency, incontinence, retention, or nocturia.  Musculoskeletal: Patient denies increased warmth; redness; or swelling of joints; limitation of function; deformity; crepitation: pain in a joint or an extremity, the neck, or the back, especially with movement.  Neurological: Patient denies dizziness, tremor, ataxia, difficulty in speaking, change in speech, paresthesia, loss of sensation, seizures, syncope, changes in memory.  Endocrine system: Patient denies tremors, palpitations, intolerance of heat or cold, polyuria, polydipsia, polyphagia, diaphoresis, exophthalmos, or goiter.  Psychological: Patient denies thoughts/plans or harming self or other; depression,  insomnia, night terrors, sindy, memory loss, disorientation.  Skin: Patient denies any bruising, rashes, discoloration, pruritus, wounds, ulcers, decubiti, changes in the hair or nails  Hematopoietic: Patient denies history of spontaneous or excessive bleeding, epistaxis, hematuria, melena, fatigue, enlarged or tender lymph nodes, pallor, history of anemia.    Physical Exam: 75 y.o. female  General Appearance:    Alert, cooperative, in no acute distress                      Vitals:    07/02/25 1926 07/02/25 2339 07/03/25 0344 07/03/25 0714   BP: 126/66 127/66 122/55 138/68   BP Location: Left arm Left arm Left arm Left arm   Patient Position: Lying Lying Lying Lying   Pulse: 83 80 80 76   Resp: 16 16 16 18   Temp: 98.1 °F (36.7 °C) 98.1 °F (36.7 °C) 98 °F (36.7 °C) 98.1 °F (36.7 °C)   TempSrc: Oral Oral Oral Oral   SpO2: 96% 96% 99% 96%   Weight:       Height:            Head:    Normocephalic, without obvious abnormality, atraumatic   Eyes:            Lids and lashes normal, PERRLA   Ears:    Ears appear intact with no abnormalities noted   Throat:   No oral lesions, oral mucosa moist   Neck:   Trachea midline, no thyromegaly, no JVD    Back:     No skin lesions, erythema, no tendernessl   Lungs:     Respirations regular,  even and unlabored    Heart:    Regular rhythm and normal rate   Chest Wall:    No abnormalities observed   Abdomen:     Soft non-tender, non-distended   Rectal:     Deferred   Extremities:   Tenderness over right shoulder and proximal humerus. Moves all other extremities well, no edema,   no cyanosis, no redness   Pulses:   Pulses palpable and equal bilaterally   Skin:   No bleeding, bruising or rash   Lymph nodes:   No palpable adenopathy   Neurologic:   Cranial nerves 2 - 12 grossly intact, sensation intact, DTR       present and equal bilaterally           Diagnostic Tests:  [unfilled]    [unfilled]    Assessment:    S/P reverse total shoulder arthroplasty, right      Plan:  The patient voiced understanding of the risks, benefits, and alternative forms of treatment that were discussed and the patient consents to proceed with right reverse total shoulder arthroplasty for fracture.     Discharge Plan: tomorrow to home    Date: [unfilled]  Barber Santamaria MD

## (undated) DEVICE — BIT DRL EQUINOXE 2 AND 3.2MM

## (undated) DEVICE — SKIN PREP TRAY W/CHG: Brand: MEDLINE INDUSTRIES, INC.

## (undated) DEVICE — PATIENT RETURN ELECTRODE, SINGLE-USE, CONTACT QUALITY MONITORING, ADULT, WITH 9FT CORD, FOR PATIENTS WEIGING OVER 33LBS. (15KG): Brand: MEGADYNE

## (undated) DEVICE — PREP IM ENCHANCED TOTAL HIP BONE                                    PREPARATION KIT: Brand: PREP-IM

## (undated) DEVICE — 450 ML BOTTLE OF 0.05% CHLORHEXIDINE GLUCONATE IN 99.95% STERILE WATER FOR IRRIGATION, USP AND APPLICATOR.: Brand: IRRISEPT ANTIMICROBIAL WOUND LAVAGE

## (undated) DEVICE — STPLR SKIN VISISTAT WD 35CT

## (undated) DEVICE — YANKAUER,POOLE TIP,STERILE,50/CS: Brand: MEDLINE

## (undated) DEVICE — DRAPE,U/ SHT,SPLIT,PLAS,STERIL: Brand: MEDLINE

## (undated) DEVICE — GLV SURG BIOGEL LTX PF 6 1/2

## (undated) DEVICE — PK SHLDR OPN 40

## (undated) DEVICE — THE STERILE LIGHT HANDLE COVER IS USED WITH STERIS SURGICAL LIGHTING AND VISUALIZATION SYSTEMS.

## (undated) DEVICE — GLV SURG BIOGEL LTX PF 8 1/2

## (undated) DEVICE — SUT ETHIB 2 CV V37 MS/4 30IN MX69G

## (undated) DEVICE — ZIP 16 SURGICAL SKIN CLOSURE DEVICE, PSA: Brand: ZIP 16 SURGICAL SKIN CLOSURE DEVICE

## (undated) DEVICE — SUT VIC 0 CT1 36IN J946H

## (undated) DEVICE — DRESSING,GAUZE,XEROFORM,CURAD,1"X8",ST: Brand: CURAD

## (undated) DEVICE — BLANKT WARM LOWR/BDY 100X120CM

## (undated) DEVICE — GLND KWIRE
Type: IMPLANTABLE DEVICE | Site: SHOULDER | Status: NON-FUNCTIONAL
Brand: EQUINOXE
Removed: 2025-01-23

## (undated) DEVICE — HANDPIECE SET WITH COAXIAL HIGH FLOW TIP AND SUCTION TUBE: Brand: INTERPULSE

## (undated) DEVICE — BNDG ELAS CO-FLEX SLF ADHR 4IN5YD LF STRL

## (undated) DEVICE — MAT FLR ABSORBENT LG 4FT 10 2.5FT

## (undated) DEVICE — GLV SURG BIOGEL LTX PF 8

## (undated) DEVICE — SHEET, DRAPE, SPLIT, STERILE: Brand: MEDLINE

## (undated) DEVICE — KT BIT DRL EXATECHGPS REV 2MM 3.2MM DISP

## (undated) DEVICE — ARM SLING: Brand: DEROYAL

## (undated) DEVICE — SUT VIC 2/0 X1 27IN J459H

## (undated) DEVICE — DUAL CUT SAGITTAL BLADE

## (undated) DEVICE — KT SHLDR EXACTECHGPS DISP

## (undated) DEVICE — KT PIN HEX SHLDR CORACOID EXACTECHGPS DISP